# Patient Record
Sex: FEMALE | Race: WHITE | URBAN - METROPOLITAN AREA
[De-identification: names, ages, dates, MRNs, and addresses within clinical notes are randomized per-mention and may not be internally consistent; named-entity substitution may affect disease eponyms.]

---

## 2017-08-09 ENCOUNTER — THERAPY VISIT (OUTPATIENT)
Dept: PHYSICAL THERAPY | Facility: CLINIC | Age: 82
End: 2017-08-09
Payer: MEDICARE

## 2017-08-09 DIAGNOSIS — R27.9 LACK OF COORDINATION: ICD-10-CM

## 2017-08-09 DIAGNOSIS — R35.0 URINARY FREQUENCY: ICD-10-CM

## 2017-08-09 DIAGNOSIS — N81.84 PELVIC RELAXATION DUE TO PELVIC MUSCLE WASTING: Primary | ICD-10-CM

## 2017-08-09 PROCEDURE — G8990 OTHER PT/OT CURRENT STATUS: HCPCS | Mod: GP | Performed by: PHYSICAL THERAPIST

## 2017-08-09 PROCEDURE — G8991 OTHER PT/OT GOAL STATUS: HCPCS | Mod: GP | Performed by: PHYSICAL THERAPIST

## 2017-08-09 PROCEDURE — 97110 THERAPEUTIC EXERCISES: CPT | Mod: GP | Performed by: PHYSICAL THERAPIST

## 2017-08-09 PROCEDURE — 97161 PT EVAL LOW COMPLEX 20 MIN: CPT | Mod: GP | Performed by: PHYSICAL THERAPIST

## 2017-08-09 NOTE — MR AVS SNAPSHOT
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    -  1    Lack of coordination        Urinary frequency           Follow-ups after your visit        Your next 10 appointments already scheduled     Aug 16, 2017  2:30 PM CDT   [facility] For [facility] Only with Hilda Grajeda [facility] ([facility]  )    [address] [facility] [address]   [phone]            Aug 23, 2017  2:30 PM CDT   [facility] For Women Only with Hilda Grajeda [facility] ([facility]  )    [address]  [facility] [address]   [phone]            Aug 30, 2017  1:50 PM CDT   [facility] For Women Only with Hilda Grajeda, [facility] ([facility]  )    [address]  [place] [address]   [phone]              Who to contact     If you have questions or need follow up information about today's clinic visit or your schedule please contact [facility] directly at [phone].  Normal or non-critical lab and imaging results will be communicated to you by MyChart, letter or phone within 4 business days after the clinic has received the results. If you do not hear from us within 7 days, please contact the clinic through MyChart or phone. If you have a critical or abnormal lab result, we will notify you by phone as soon as possible.  Submit refill requests through [employer] or call your pharmacy and they will forward the refill request to us. Please allow 3 business days for your refill to be completed.          Additional Information About Your  Visit        Tap[employer] Information     [employer] gives you secure access to your electronic health record. If you see a primary care provider, you can also send messages to your care team and make appointments. If you have questions, please call your primary care clinic.  If you do not have a primary care provider, please call [phone] and they will assist you.        Care EveryWhere ID     This is your Care EveryWhere ID. This could be used by other organizations to access your [place] medical records  [id number]         Blood Pressure from Last 3 Encounters:   No data found for BP    Weight from Last 3 Encounters:   No data found for Wt              We Performed the Following     HC PT EVAL, LOW COMPLEXITY     PAYAM CERT REPORT     THERAPEUTIC EXERCISES        Primary Care Provider Office Phone # Fax #    [facility] [phone] [address] [place] [address]        Equal Access to Services     : Hadii aad ku hadasho Soomaali, waaxda luqadaha, qaybta kaalmada adeegyada, waxay arianin hayaan sabas henry . So [facility] [phone].    ATENCIÓN: Si habla español, tiene a bennett disposición servicios gratuitos de asistencia lingüística. Angieame al [phone].    We comply with applicable federal civil rights laws and [place] laws. We do not discriminate on the basis of race, color, national origin, age, disability sex, sexual orientation or gender identity.            Thank you!     Thank you for choosing [facility]  for your care. Our goal is always to provide you with excellent care. Hearing back from our patients is one way we can continue to improve our services. Please take a few minutes to complete the written survey that you may receive in the mail after your visit with us. Thank you!             Your Updated Medication List - Protect others around you: Learn how to safely use, store and throw away your medicines at  www.disposemymeds.org.      Notice  As of 8/9/2017  3:06 PM    You have not been prescribed any medications.

## 2017-08-09 NOTE — PROGRESS NOTES
[place] [facility] Initial Evaluation      Subjective:    Patient is a 85 year old female presenting with rehab pelvic hpi. The history is provided by the patient.   Tani Ferreira is a 85 year old female with a pelvic dysfunction condition.  Condition occurred with:  Insidious onset.  Condition occurred: for unknown reasons.  This is a chronic condition  Pt states that she saw a urologist in [place] who diagnosed her w/a prolapse and referred her to PT, which she did for 6-8 sessions already there.  She returned to [place] in May and has not been doing the PT ex's since she has returned though.  She would like to continue to work on her PF mm, so she did have her PCP refer her to PT again on 7/31/17.  She does note that she has had several UTIs, and just got over one in July. She does think that the probiotics seem to help with her irritable bowel, and seems to have helped some with the urinary sx's as well.  Now that UTI is gone, her urinary leaking and frequency have gone away.  She voids about every 2-3 hrs during the day and is only getting up 1x/night. She does wear a pad, but denies having any urinary leaking.  Her BMs are regular w/NL stool consistency.  She is only drinking about 6 glasses of water per day, and about 2-3 cups of coffee per day.  She used to also drink cranberry juice, but has not done that in months..    Site of Pain: none.    Quality: NA. Episode frequency: NA.      Symptoms are exacerbated by stress (for UTIs, pt thinks)     Special testing: US on kidneys.  Previous treatment includes physical therapy.  There was moderate improvement following previous treatment.  General health as reported by patient is good.  Pertinent medical history includes:  N/A.  Medical allergies: none.  Other surgeries include:  None.  Current medications:  None as reported by the patient.  Current occupation is retired.    Primary job tasks include:  Driving, prolonged sitting, prolonged standing and  repetitive tasks (housecleaning, laundry, cooking, cares for her dog).    Barriers include:  None as reported by the patient and lives alone.    Red flags:  None as reported by the patient.                        Objective:    System                                 Pelvic Dysfunction Evaluation:    Bladder/Pelvic Problems:  Not much of a problem now that UTI has cleared  Storage Problem:  Frequency  Emptying Problem:  Incomplete emptying      Diagnostic Tests:  Diagnostic tests pelvic: in [place], possibly had urodynamics?                        Flexibility:  normal        Pelvic Clock Exam:  NA              Reflex Testing:  normal    External Assessment:    Skin Condition:  Atrophic    Bearing Down/Coughing:  Cystourethrocele  Tissue Symmetry:  Normal  Introitus:  Normal  Muscle Contraction/Perineal Mobility:  Slight lift, no urogential triangle descent  Internal Assessment:  Internal assessment pelvic: deferred per pt wishes.              SEMG Biofeedback:    Equipment:  MR-10 unit    Suraface electrode placement--Perianal:  Bilaterally placed  Baseline EMG PM:  1.5 uV    Peak pelvic muscle contraction:  10  Sustained contraction:  Avg of 5 uV, difficult for pt to maintain steady contraction, tends to hold breath, and when she breathes NL'ly, loses the PF contraction  EMG interpretation to fatigue:  3-5 seconds  Position:  SupineAdditional History:    Number of Pregnancies: 3    Caffeine Consumption:  2-3 per day                     General     ROS    Assessment/Plan:      Patient is a 85 year old female with pelvic complaints.    Patient has the following significant findings with corresponding treatment plan.                Diagnosis 1:  PF dysfunction w/prolapse  Decreased strength - therapeutic exercise and therapeutic activities  Impaired muscle performance - biofeedback and neuro re-education  Decreased function - therapeutic activities    Therapy Evaluation Codes:   1) History comprised of:   Personal  factors that impact the plan of care:      Past/current experiences and Time since onset of symptoms.    Comorbidity factors that impact the plan of care are:      None.     Medications impacting care: None.  2) Examination of Body Systems comprised of:   Body structures and functions that impact the plan of care:      Pelvis.   Activity limitations that impact the plan of care are:      Frequency.  3) Clinical presentation characteristics are:   Stable/Uncomplicated.  4) Decision-Making    Low complexity using standardized patient assessment instrument and/or measureable assessment of functional outcome.  Cumulative Therapy Evaluation is: Low complexity.    Previous and current functional limitations:  (See Goal Flow Sheet for this information)    Short term and Long term goals: (See Goal Flow Sheet for this information)     Communication ability:  Patient appears to be able to clearly communicate and understand verbal and written communication and follow directions correctly.  Treatment Explanation - The following has been discussed with the patient:   RX ordered/plan of care  Anticipated outcomes  Possible risks and side effects  This patient would benefit from PT intervention to resume normal activities.   Rehab potential is good.    Frequency:  1 X week, once daily  Duration:  for 4 weeks tapering to 2 X a month over 1 month  Discharge Plan:  Achieve all LTG.  Independent in home treatment program.  Reach maximal therapeutic benefit.    Please refer to the daily flowsheet for treatment today, total treatment time and time spent performing 1:1 timed codes.

## 2017-08-09 NOTE — LETTER
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 The history is provided by the patient.   Tani Ferreira is a 85 year old female with a pelvic dysfunction condition.  Condition occurred with:  Insidious onset.  Condition occurred: for unknown reasons.  This is a chronic condition  Pt states that she saw a urologist in [place] who diagnosed her w/a prolapse and referred her to PT, which she did for 6-8 sessions already there.  She returned to [place] in May and has not been doing the PT ex's since she has returned though.  She would like to continue to work on her PF mm, so she did have her PCP refer her to PT again on 17.  She does note that she has had several UTIs, and just got over one in July. She does think that the probiotics seem to help with her irritable bowel, and seems to have helped some with the urinary sx's as well.  Now that UTI is gone, her urinary leaking and frequency have gone away.  She voids about every 2-3 hrs during the day and is only getting up 1x/night. She does wear a pad, but denies having any urinary leaking.  Her BMs are regular w/NL stool consistency.  She is only drinking about 6 glasses of water per day, and about 2-3 cups of coffee per day.  She used to also drink cranberry juice, but has not done that in months..     Site of Pain: none.    Quality: NA. Episode frequency: NA.      Symptoms are exacerbated by stress (for UTIs, pt thinks)     Special testing: US on kidneys.  Previous treatment includes physical therapy.  There was moderate improvement following previous treatment.  General health as reported by patient is good.  Pertinent medical history includes:  N/A.  Medical allergies: none.  Other surgeries include:  None.  Current medications:  None as reported by the patient.  Current occupation is retired.    Primary job tasks include:  Driving, prolonged sitting, prolonged standing and repetitive tasks (housecleaning, laundry, cooking, cares for her dog).  Barriers include:  None as reported by the patient and lives alone.  Red flags:  None as reported by the patient.    Objective:    Pelvic Dysfunction Evaluation:    Bladder/Pelvic Problems:  Not much of a problem now that UTI has cleared  Storage Problem:  Frequency  Emptying Problem:  Incomplete emptying      Diagnostic Tests:  Diagnostic tests pelvic: in [place], possibly had urodynamics?  Flexibility:  normal  Pelvic Clock Exam:  NA  Reflex Testing:  normal  External Assessment:    Skin Condition:  Atrophic  Bearing Down/Coughing:  Cystourethrocele  Tissue Symmetry:  Normal  Introitus:  Normal  Muscle Contraction/Perineal Mobility:  Slight lift, no urogential triangle descent  Internal Assessment:  Internal assessment pelvic: deferred per pt wishes.  SEMG Biofeedback:    Equipment:  MR-10 unit  Suraface electrode placement--Perianal:  Bilaterally placed  Baseline EMG PM:  1.5 uV          PATIENTS NAME:  Tani Ferreira             : 1932  Peak pelvic muscle contraction:  10  Sustained contraction:  Avg of 5 uV, difficult for pt to maintain steady contraction, tends to hold breath, and when she breathes NL'ly, loses the PF contraction  EMG interpretation to fatigue:  3-5 seconds  Position:  SupineAdditional History:  Number of Pregnancies: 3  Caffeine  Consumption:  2-3 per day          Assessment/Plan:    Patient is a 85 year old female with pelvic complaints.    Patient has the following significant findings with corresponding treatment plan.                Diagnosis 1:  PF dysfunction w/prolapse  Decreased strength - therapeutic exercise and therapeutic activities  Impaired muscle performance - biofeedback and neuro re-education  Decreased function - therapeutic activities    Therapy Evaluation Codes:   1) History comprised of:   Personal factors that impact the plan of care:      Past/current experiences and Time since onset of symptoms.    Comorbidity factors that impact the plan of care are:      None.     Medications impacting care: None.  2) Examination of Body Systems comprised of:   Body structures and functions that impact the plan of care:      Pelvis.   Activity limitations that impact the plan of care are:      Frequency.  3) Clinical presentation characteristics are:   Stable/Uncomplicated.  4) Decision-Making    Low complexity using standardized patient assessment instrument and/or measureable assessment of functional outcome.  Cumulative Therapy Evaluation is: Low complexity.  Previous and current functional limitations:  (See Goal Flow Sheet for this information)    Short term and Long term goals: (See Goal Flow Sheet for this information)   Communication ability:  Patient appears to be able to clearly communicate and understand verbal and written communication and follow directions correctly.  Treatment Explanation - The following has been discussed with the patient:   RX ordered/plan of care  Anticipated outcomes  Possible risks and side effects  This patient would benefit from PT intervention to resume normal activities.   Rehab potential is good.  Frequency:  1 X week, once daily  Duration:  for 4 weeks tapering to 2 X a month over 1 month  Discharge Plan:  Achieve all LTG.  Independent in home treatment program.  Reach maximal therapeutic  "benefit.  Please refer to the daily flowsheet for treatment today, total treatment time and time spent performing 1:1 timed codes.     Caregiver Signature/Credentials _____________________________ Date ________       AGUILAR Byrd   I have reviewed and certified the need for these services and plan of treatment while under my care.      PHYSICIAN'S SIGNATURE:   _____________________________________  Date___________                     Jillian Angel  Certification period:  Beginning of Cert date period: 08/09/17 to  End of Cert period date: 11/06/17     Functional Level Progress Report: Please see attached \"Goal Flow sheet for Functional level.\"  ____X____ Continue Services or       ________ DC Services              Service dates: From  SOC Date: 08/09/17 date to present                       "

## 2017-08-23 ENCOUNTER — THERAPY VISIT (OUTPATIENT)
Dept: PHYSICAL THERAPY | Facility: CLINIC | Age: 82
End: 2017-08-23
Payer: MEDICARE

## 2017-08-23 DIAGNOSIS — R27.9 LACK OF COORDINATION: ICD-10-CM

## 2017-08-23 DIAGNOSIS — R35.0 URINARY FREQUENCY: ICD-10-CM

## 2017-08-23 DIAGNOSIS — N81.84 PELVIC RELAXATION DUE TO PELVIC MUSCLE WASTING: ICD-10-CM

## 2017-08-23 PROCEDURE — 97110 THERAPEUTIC EXERCISES: CPT | Mod: GP | Performed by: PHYSICAL THERAPIST

## 2017-08-23 PROCEDURE — 97530 THERAPEUTIC ACTIVITIES: CPT | Mod: GP | Performed by: PHYSICAL THERAPIST

## 2017-08-30 ENCOUNTER — THERAPY VISIT (OUTPATIENT)
Dept: PHYSICAL THERAPY | Facility: CLINIC | Age: 82
End: 2017-08-30
Payer: MEDICARE

## 2017-08-30 DIAGNOSIS — R35.0 URINARY FREQUENCY: ICD-10-CM

## 2017-08-30 DIAGNOSIS — R27.9 LACK OF COORDINATION: ICD-10-CM

## 2017-08-30 DIAGNOSIS — N81.84 PELVIC RELAXATION DUE TO PELVIC MUSCLE WASTING: ICD-10-CM

## 2017-08-30 PROCEDURE — 97112 NEUROMUSCULAR REEDUCATION: CPT | Mod: GP | Performed by: PHYSICAL THERAPIST

## 2017-08-30 PROCEDURE — 97110 THERAPEUTIC EXERCISES: CPT | Mod: GP | Performed by: PHYSICAL THERAPIST

## 2017-09-07 ENCOUNTER — THERAPY VISIT (OUTPATIENT)
Dept: PHYSICAL THERAPY | Facility: CLINIC | Age: 82
End: 2017-09-07
Payer: MEDICARE

## 2017-09-07 DIAGNOSIS — N81.84 PELVIC RELAXATION DUE TO PELVIC MUSCLE WASTING: ICD-10-CM

## 2017-09-07 DIAGNOSIS — R35.0 URINARY FREQUENCY: ICD-10-CM

## 2017-09-07 DIAGNOSIS — R27.9 LACK OF COORDINATION: ICD-10-CM

## 2017-09-07 PROCEDURE — 97112 NEUROMUSCULAR REEDUCATION: CPT | Mod: GP | Performed by: PHYSICAL THERAPIST

## 2017-09-07 PROCEDURE — 97110 THERAPEUTIC EXERCISES: CPT | Mod: GP | Performed by: PHYSICAL THERAPIST

## 2017-09-25 ENCOUNTER — THERAPY VISIT (OUTPATIENT)
Dept: PHYSICAL THERAPY | Facility: CLINIC | Age: 82
End: 2017-09-25
Payer: MEDICARE

## 2017-09-25 DIAGNOSIS — R35.0 URINARY FREQUENCY: ICD-10-CM

## 2017-09-25 DIAGNOSIS — R27.9 LACK OF COORDINATION: ICD-10-CM

## 2017-09-25 DIAGNOSIS — N81.84 PELVIC RELAXATION DUE TO PELVIC MUSCLE WASTING: ICD-10-CM

## 2017-09-25 PROCEDURE — 97530 THERAPEUTIC ACTIVITIES: CPT | Mod: GP | Performed by: PHYSICAL THERAPIST

## 2017-09-25 PROCEDURE — G8990 OTHER PT/OT CURRENT STATUS: HCPCS | Mod: GP | Performed by: PHYSICAL THERAPIST

## 2017-09-25 PROCEDURE — 97110 THERAPEUTIC EXERCISES: CPT | Mod: GP | Performed by: PHYSICAL THERAPIST

## 2017-09-25 PROCEDURE — 97112 NEUROMUSCULAR REEDUCATION: CPT | Mod: GP | Performed by: PHYSICAL THERAPIST

## 2017-09-25 PROCEDURE — G8991 OTHER PT/OT GOAL STATUS: HCPCS | Mod: GP | Performed by: PHYSICAL THERAPIST

## 2017-09-25 NOTE — MR AVS SNAPSHOT
After Visit Summary   9/25/2017    Tani Ferreira    MRN: [id number]           Patient Information     Date Of Birth          5/14/1932        Visit Information        Provider Department      9/25/2017 12:20 PM Hilda Grajeda PT [place] 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 ([facility]  )    [address]  [facility] [id number] [phone]              Who to contact     If you have questions or need follow up information about today's clinic visit or your schedule please contact [place] [facility] CLYDE TANYA directly at [phone].  Normal or non-critical lab and imaging results will be communicated to you by [employer], letter or phone within 4 business days after the clinic has received the results. If you do not hear from us within 7 days, please contact the clinic through Subtex[employer] or phone. If you have a critical or abnormal lab result, we will notify you by phone as soon as possible.  Submit refill requests through [employer] or call your pharmacy and they will forward the refill request to us. Please allow 3 business days for your refill to be completed.          Additional Information About Your Visit        [employer] Information     [employer] gives you secure access to your electronic health record. If you see a primary care provider, you can also send messages to your care team and make appointments. If you have questions, please call your primary care clinic.  If you do not have a primary care provider, please call [phone] and they will assist you.        Care EveryWhere ID     This is your Care EveryWhere ID. This  could be used by other organizations to access your [place] medical records  [id number]         Blood Pressure from Last 3 Encounters:   No data found for BP    Weight from Last 3 Encounters:   No data found for Wt              We Performed the Following     [facility] PROGRESS NOTES REPORT     NEUROMUSCULAR RE-EDUCATION     THERAPEUTIC ACTIVITIES     THERAPEUTIC EXERCISES        Primary Care Provider Office Phone # Fax #    [facility] [phone] [address]  [place] [address]        Equal Access to Services     [facility] : Hadii aad ku hadasho Soomaali, waaxda luqadaha, qaybta kaalmada adeegyada, waxay idiin hayaan adeeg kharash la'aan . So [facility] [phone].    ATENCIÓN: Si habla español, tiene a bennett disposición servicios gratuitos de asistencia lingüística. [facility] [phone].    We comply with applicable federal civil rights laws and [place] laws. We do not discriminate on the basis of race, color, national origin, age, disability sex, sexual orientation or gender identity.            Thank you!     Thank you for choosing [facility]  for your care. Our goal is always to provide you with excellent care. Hearing back from our patients is one way we can continue to improve our services. Please take a few minutes to complete the written survey that you may receive in the mail after your visit with us. Thank you!             Your Updated Medication List - Protect others around you: Learn how to safely use, store and throw away your medicines at www.disposemymeds.org.      Notice  As of 9/25/2017  1:13 PM    You have not been prescribed any medications.

## 2017-09-25 NOTE — PROGRESS NOTES
"Subjective:    HPI                    Objective:    System    Physical Exam    General     ROS    Assessment/Plan:      PROGRESS  REPORT    Progress reporting period is from 8/9/17 to 9/25/17.       SUBJECTIVE  Subjective changes noted by patient:   Pt states that she can sit in her recliner for hrs and not feel the urge to urinate, but as soon as she gets up she feels it. She can still get to the bathroom without leaking, though. Pt notes that her \"stomach is still bothering\" her, sometimes it feels like she needs to have a BM but then nothing comes out. She is having about 3-4 BMs per day, always solid though.    Current pain level is  0/10.     Previous pain level was  0/10  .   Changes in function:  Yes (See Goal flowsheet attached for changes in current functional level)  Adverse reaction to treatment or activity: None    OBJECTIVE  Changes noted in objective findings:    Objective: PF strength about the same as last time, but slight improvement w/sustained contractions w/better breathing and steadier holds.  Avg of 15 uV in supine for tonic contractions, in sitting she avg'd 8 uV for long holds and 8 for quick contractions (but after having already done about 20 total contractions).     ASSESSMENT/PLAN  Updated problem list and treatment plan: Diagnosis 1:  PF dysfunction w/prolapse and urinary frequency  Decreased strength - therapeutic exercise and therapeutic activities  Impaired muscle performance - biofeedback and neuro re-education  Decreased function - therapeutic activities  STG/LTGs have been met or progress has been made towards goals:  Yes (See Goal flow sheet completed today.)  Assessment of Progress: The patient's condition is improving.  The patient's condition has potential to improve.  Patient is meeting short term goals and is progressing towards long term goals.  Self Management Plans:  Patient has been instructed in a home treatment program.  Patient is independent in a home treatment " program.  Patient  has been instructed in self management of symptoms.  Patient is independent in self management of symptoms.  I have re-evaluated this patient and find that the nature, scope, duration and intensity of the therapy is appropriate for the medical condition of the patient.  Tain continues to require the following intervention to meet STG and LTG's:  PT    Recommendations:  This patient would benefit from continued therapy.     Frequency:  1 X a month, once daily  Duration:  for 1 months          Please refer to the daily flowsheet for treatment today, total treatment time and time spent performing 1:1 timed codes.

## 2017-11-09 ENCOUNTER — THERAPY VISIT (OUTPATIENT)
Dept: PHYSICAL THERAPY | Facility: CLINIC | Age: 82
End: 2017-11-09
Payer: MEDICARE

## 2017-11-09 DIAGNOSIS — R27.9 LACK OF COORDINATION: ICD-10-CM

## 2017-11-09 DIAGNOSIS — N81.84 PELVIC RELAXATION DUE TO PELVIC MUSCLE WASTING: ICD-10-CM

## 2017-11-09 DIAGNOSIS — R35.0 URINARY FREQUENCY: ICD-10-CM

## 2017-11-09 PROCEDURE — G8991 OTHER PT/OT GOAL STATUS: HCPCS | Mod: GP | Performed by: PHYSICAL THERAPIST

## 2017-11-09 PROCEDURE — 97112 NEUROMUSCULAR REEDUCATION: CPT | Mod: GP | Performed by: PHYSICAL THERAPIST

## 2017-11-09 PROCEDURE — 97110 THERAPEUTIC EXERCISES: CPT | Mod: GP | Performed by: PHYSICAL THERAPIST

## 2017-11-09 PROCEDURE — G8992 OTHER PT/OT  D/C STATUS: HCPCS | Mod: GP | Performed by: PHYSICAL THERAPIST

## 2017-11-09 PROCEDURE — 97530 THERAPEUTIC ACTIVITIES: CPT | Mod: GP | Performed by: PHYSICAL THERAPIST

## 2017-11-09 NOTE — MR AVS SNAPSHOT
After Visit Summary   11/9/2017    Tani Ferreira    MRN: [id number]           Patient Information     Date Of Birth          5/14/1932        Visit Information        Provider Department      11/9/2017 12:20 PM Hilda Grajeda PT [facility]        Today's Diagnoses     Pelvic relaxation due to pelvic muscle wasting        Lack of coordination        Urinary frequency           Follow-ups after your visit        Who to contact     If you have questions or need follow up information about today's clinic visit or your schedule please contact [facility] directly at [phone].  Normal or non-critical lab and imaging results will be communicated to you by Aldagen[employer], letter or phone within 4 business days after the clinic has received the results. If you do not hear from us within 7 days, please contact the clinic through Aldagenhart or phone. If you have a critical or abnormal lab result, we will notify you by phone as soon as possible.  Submit refill requests through [employer] or call your pharmacy and they will forward the refill request to us. Please allow 3 business days for your refill to be completed.          Additional Information About Your Visit        MyChart Information     [employer] gives you secure access to your electronic health record. If you see a primary care provider, you can also send messages to your care team and make appointments. If you have questions, please call your primary care clinic.  If you do not have a primary care provider, please call [phone] and they will assist you.        Care EveryWhere ID     This is your Care EveryWhere ID. This could be used by other organizations to access your [facility] medical records  [id number]         Blood Pressure from Last 3 Encounters:   No data found for BP    Weight from Last 3 Encounters:   No data found for Wt              We Performed the Following     PAYAM  PROGRESS NOTES REPORT     NEUROMUSCULAR RE-EDUCATION     THERAPEUTIC ACTIVITIES     THERAPEUTIC EXERCISES        Primary Care Provider Office Phone # Fax #    [facility] [phone] [address] [address]        Equal Access to Services     BRADLEY JACOB : Hadii aad ku hadasho Soomaali, waaxda luqadaha, qaybta kaalmada adeegyada, waxay idiin hayarmidan ademariela coyne lamichelle . So [facility] [phone].    ATENCIÓN: Si habla español, tiene a bennett disposición servicios gratuitos de asistencia lingüística. Llame al [phone].    We comply with applicable federal civil rights laws and [place] laws. We do not discriminate on the basis of race, color, national origin, age, disability, sex, sexual orientation, or gender identity.            Thank you!     Thank you for choosing [facility]  for your care. Our goal is always to provide you with excellent care. Hearing back from our patients is one way we can continue to improve our services. Please take a few minutes to complete the written survey that you may receive in the mail after your visit with us. Thank you!             Your Updated Medication List - Protect others around you: Learn how to safely use, store and throw away your medicines at www.disposemymeds.org.      Notice  As of 11/9/2017  1:58 PM    You have not been prescribed any medications.

## 2017-11-09 NOTE — PROGRESS NOTES
Subjective:    HPI                    Objective:    System    Physical Exam    General     ROS    Assessment/Plan:      DISCHARGE REPORT    Progress reporting period is from 9/25/17 to 11/9/17.       SUBJECTIVE  Subjective changes noted by patient:   Pt notes that [place] but has been rechecked 1 wk ago and it is gone.  She admits that she doesn't remember to do her ex's every day, but doesn't feel that the prolapse is too bad.    Current pain level is  0/10.     Previous pain level was  0/10  .   Changes in function:  Yes (See Goal flowsheet attached for changes in current functional level)  Adverse reaction to treatment or activity: None    OBJECTIVE  Changes noted in objective findings:    PF strength is improved for phasic, going from an avg of 3 uV in sitting to 10 uV in sitting, but the tonic contractions are still poorly held/maintained, and pt tends to hold her breath yet, with an avg of only 5 uV.  On observation, prolapse is still visible w/cough, but she is able to maintain position of it w/PFC w/cough.     ASSESSMENT/PLAN  Updated problem list and treatment plan: Diagnosis 1:  PF dysfunction w/prolapse    STG/LTGs have been met or progress has been made towards goals:  Yes (See Goal flow sheet completed today.)  Assessment of Progress: The patient has met all of their long term goals.  Self Management Plans:  Patient has been instructed in a home treatment program.  Patient is independent in a home treatment program.  Patient  has been instructed in self management of symptoms.  Patient is independent in self management of symptoms.    Tani continues to require the following intervention to meet STG and LTG's:  PT intervention is no longer required to meet STG/LTG.    Recommendations:  This patient is ready to be discharged from therapy and continue their home treatment program.    Please refer to the daily flowsheet for treatment today, total treatment time and  time spent performing 1:1 timed codes.

## 2018-03-02 ENCOUNTER — OFFICE VISIT (OUTPATIENT)
Dept: INTERNAL MEDICINE | Facility: CLINIC | Age: 83
End: 2018-03-02
Payer: MEDICARE

## 2018-03-02 ENCOUNTER — OFFICE VISIT (OUTPATIENT)
Dept: UROLOGY | Facility: CLINIC | Age: 83
End: 2018-03-02
Payer: MEDICARE

## 2018-03-02 VITALS
DIASTOLIC BLOOD PRESSURE: 96 MMHG | HEIGHT: 62 IN | SYSTOLIC BLOOD PRESSURE: 164 MMHG | OXYGEN SATURATION: 96 % | TEMPERATURE: 98 F | HEART RATE: 98 BPM | BODY MASS INDEX: 27.34 KG/M2 | WEIGHT: 148.56 LBS

## 2018-03-02 DIAGNOSIS — R39.89 SUSPECTED UTI: Primary | ICD-10-CM

## 2018-03-02 DIAGNOSIS — N39.0 RECURRENT UTI: Primary | ICD-10-CM

## 2018-03-02 LAB
BACTERIA #/AREA URNS AUTO: ABNORMAL /HPF
BILIRUB UR QL STRIP: NEGATIVE
CLARITY UR REFRACT.AUTO: CLEAR
COLOR UR AUTO: YELLOW
GLUCOSE UR QL STRIP: NEGATIVE
HGB UR QL STRIP: NEGATIVE
KETONES UR QL STRIP: NEGATIVE
LEUKOCYTE ESTERASE UR QL STRIP: ABNORMAL
MICROSCOPIC COMMENT: ABNORMAL
NITRITE UR QL STRIP: NEGATIVE
NON-SQ EPI CELLS #/AREA URNS AUTO: 1 /HPF
PH UR STRIP: 5 [PH] (ref 5–8)
PROT UR QL STRIP: NEGATIVE
SP GR UR STRIP: 1 (ref 1–1.03)
SQUAMOUS #/AREA URNS AUTO: 2 /HPF
URN SPEC COLLECT METH UR: ABNORMAL
UROBILINOGEN UR STRIP-ACNC: NEGATIVE EU/DL
WBC #/AREA URNS AUTO: 5 /HPF (ref 0–5)

## 2018-03-02 PROCEDURE — 99999 PR PBB SHADOW E&M-EST. PATIENT-LVL II: CPT | Mod: PBBFAC,,, | Performed by: STUDENT IN AN ORGANIZED HEALTH CARE EDUCATION/TRAINING PROGRAM

## 2018-03-02 PROCEDURE — 87077 CULTURE AEROBIC IDENTIFY: CPT

## 2018-03-02 PROCEDURE — 87088 URINE BACTERIA CULTURE: CPT

## 2018-03-02 PROCEDURE — 99212 OFFICE O/P EST SF 10 MIN: CPT | Mod: PBBFAC,27,PO | Performed by: STUDENT IN AN ORGANIZED HEALTH CARE EDUCATION/TRAINING PROGRAM

## 2018-03-02 PROCEDURE — 87086 URINE CULTURE/COLONY COUNT: CPT

## 2018-03-02 PROCEDURE — 99204 OFFICE O/P NEW MOD 45 MIN: CPT | Mod: S$PBB,,, | Performed by: STUDENT IN AN ORGANIZED HEALTH CARE EDUCATION/TRAINING PROGRAM

## 2018-03-02 PROCEDURE — 87186 SC STD MICRODIL/AGAR DIL: CPT

## 2018-03-02 PROCEDURE — 99203 OFFICE O/P NEW LOW 30 MIN: CPT | Mod: S$PBB,,, | Performed by: INTERNAL MEDICINE

## 2018-03-02 PROCEDURE — 99204 OFFICE O/P NEW MOD 45 MIN: CPT | Mod: PBBFAC,PO | Performed by: INTERNAL MEDICINE

## 2018-03-02 PROCEDURE — 81001 URINALYSIS AUTO W/SCOPE: CPT

## 2018-03-02 PROCEDURE — 99999 PR PBB SHADOW E&M-NEW PATIENT-LVL IV: CPT | Mod: PBBFAC,,, | Performed by: INTERNAL MEDICINE

## 2018-03-02 RX ORDER — PHENAZOPYRIDINE HYDROCHLORIDE 200 MG/1
200 TABLET, FILM COATED ORAL 3 TIMES DAILY PRN
Qty: 9 TABLET | Refills: 0 | Status: SHIPPED | OUTPATIENT
Start: 2018-03-02 | End: 2018-03-05

## 2018-03-02 RX ORDER — CIPROFLOXACIN 250 MG/1
250 TABLET, FILM COATED ORAL 2 TIMES DAILY
Qty: 10 TABLET | Refills: 0 | Status: SHIPPED | OUTPATIENT
Start: 2018-03-02 | End: 2018-03-07

## 2018-03-02 NOTE — PROGRESS NOTES
"Subjective:       Patient ID: Tristin Weeks is a 85 y.o. female.    Chief Complaint: Urinary Frequency    HPI Mrs. Weeks is an 85-year-old female who presents with a chief complaint of urinary frequency.  Mrs. Weeks has been living in both the [place] area as well as in [place].  She reports a history of frequent urinary tract infections.  She has had urinalyses and urine cultures performed almost monthly since June 2017.  On review of her prior cultures, it appears that one has grown Klebsiella and one has grown klebsiella-Raoultella.  Patient's daughter pulled up her medical my chart in [place] and was looking for the sensitivities to her prior urine cultures.  Unfortunately she was unable to see the sensitivities. Another culture showed multiple organisms indicating contamination.  The rest were negative/normal.     She has most recently been treated for Urinary tract infection by her PCP in [place] who treated her with keflex 500 mg PO BID X 7 days. She started this on 2/20 and ended this regimen on 2/27.      She felt a little better while taking the Keflex.  However her symptoms never resolved.  Her symptoms include "not feeling well", urinary frequency, and pressure in the suprapubic region.  She denies any dysuria or hematuria. Her daughter has noticed that her mother's urine is cloudy. Of note, she has seen a urologist in the past (thinks he was at [facility]) who diagnosed her with a cystocele.  She was told that she may need surgery to correct this and she did not want surgery.  She has not continued to follow with a urologist.    Review of Systems   Gastrointestinal: Positive for abdominal pain (suprapubic discomfort).   Genitourinary: Positive for frequency. Negative for dysuria, hematuria and vaginal pain.       Objective:      Physical Exam   Constitutional: She is oriented to person, place, and time. She appears well-developed and well-nourished. No distress.   Cardiovascular: Normal " rate, regular rhythm, normal heart sounds and intact distal pulses.  Exam reveals no gallop and no friction rub.    No murmur heard.  Abdominal: Soft. Bowel sounds are normal. She exhibits no distension and no mass. There is no tenderness (no tenderness currently, during exam). There is no rebound and no guarding.   Neurological: She is alert and oriented to person, place, and time.   Skin: Skin is warm and dry. She is not diaphoretic.   Psychiatric: She has a normal mood and affect. Her behavior is normal. Judgment and thought content normal.   Vitals reviewed.      Assessment:       1. Suspected UTI        Plan:         1. Suspected UTI  -UA and urine culture obtained. Rapid in office Urine dipstick:     Yellow color, sp gravity 1.000, pH 5, +1 leukocytes, neg nitrates, trace protein, norm glucose, neg ketones, norm urobiliogen, neg bilirubin, blood 50  -Treating empirically with ciprofloxacin. Pyridium for discomfort/pain  -Referral placed to urology; has appt with Dr. Farrar at 2 pm    RTC for worsening symptoms  Will return next week for establish care visit

## 2018-03-02 NOTE — Clinical Note
"Cole Enrique, thanks for the referral. I'm going to have them finish up the cipro you Rx'd them then come back and I'll catheterize them on Friday and review their renal ultrasound.  In the future if she ever has these symptoms and would like to rule out a UTI, have them come and see me or my nurse to obtain a catheterized urine sample to get the best possible specimen for culture. I saw from her historical cultures theres a lot of contaminants in her samples... So I want to make sure it's truly related to a UTI and not anything else. Some of her symptoms "feeling out of it, weakness in the legs, etc" don't quite fit a UTI "

## 2018-03-02 NOTE — PATIENT INSTRUCTIONS
Discussed healthy voiding habits to help try to prevent recurrence of UTI's including wiping habits, avoiding douching products, avoiding tub soaks, increase hydration.   Goal of 8 glasses of water per day.

## 2018-03-02 NOTE — PROGRESS NOTES
"Subjective:       Patient ID: Tristin Weeks is a 85 y.o. female.    Chief Complaint: recurrent UTIs  This is a 85 y.o.  female patient that is new to me.  The patient is referred to me by Dr. Anastasiya Escobedo for recurrent UTIs.  She is here today with her daughter Ms. Black (we had her sign permission today so if we are unable to reach the patient or if she doesn't understand, we have her permission to contact her daughter). She reports a history of urinary tract infections, approximately 2 per year. She states that her UTI symptoms- she "feels awful", frequency, denies dysuria, denies gross hematuria, her daughter describes that she is more combative and agitated when she may have a UTI. The patient reports that when she "begins to feel bad" she lets it "go on for awhile until it gets really bad and I want to die." She has a difficult time describing what symptoms escalate to the point of that severe level of bother. She believes when it is that severe she has a headache, difficulty walking, and feels like her legs are weak. Her daughter fills in the gaps and informed me that sometimes her mom becomes very agitated, combative, and has short term memory loss without recollection of their interactions or conversations.   She has been evaluated by Dr. Andino at [facility]. She was prescribed estrogen cream but stopped it because she "didn't like it." She feels like she recalls that it caused some local itchiness. She was also referred to pelvic floor therapy and feels like this therapy did help. She was told she has a cystocele but was not recommended any intervention for this and was told it was mild per the patient.     She drinks a cup of coffee every morning, then some water possibly 2 cups of water during the day.     Pt reports she cannot give us a urine specimen as she just urinated at Dr. Escobedo's.   US about 1 year ago. Her daughter feels like she recalls no other finding other than the kidneys were " ""smaller"    She moved to [place] from [place]. She lives in [place] for 6 months and in [place] for the other 6 months.   She is here today with her daughter - Sofia.     Urine culture history from outside reports (SCANNED INTO MEDIA TODAY)  3/7/17 - mixed microbial population, no predominating organisms, probable contaminants  The next 4 urine culture results were obtained from a mychart printout by the patient's daughter - no sensitivity results available  9/27/17 - <10K CFU multiple organisms, probably contaminants  9/27/17 - 50-100K CFU klebsiella oxytoca  11/2/17 - no growth  12/18/17 - >100K Raoultella ornithinolytica  (per outside ID consultant, this is a gram negative organism that used to be classified as klebsiella)  [facility] result:  2/20/18 - Raoultella planticola >100K - sensitive to: amox/clav, cefepime, ceftriaxone, cefuroxime, cephalothin, cipro, gent, imipenem, nitrofurantoin, tetracycline, tobramycin, bactrim; resistant to: ampicillin, piperacillin    No results found for: CREATININE    I personally reviewed the images: no imaging to review  ---  No past medical history on file.    Past Surgical History:   Procedure Laterality Date    APPENDECTOMY         No family history on file.    Social History   Substance Use Topics    Smoking status: Former Smoker    Smokeless tobacco: Never Used    Alcohol use No       Current Outpatient Prescriptions on File Prior to Visit   Medication Sig Dispense Refill    ciprofloxacin HCl (CIPRO) 250 MG tablet Take 1 tablet (250 mg total) by mouth 2 (two) times daily. 10 tablet 0    phenazopyridine (PYRIDIUM) 200 MG tablet Take 1 tablet (200 mg total) by mouth 3 (three) times daily as needed for Pain. 9 tablet 0     No current facility-administered medications on file prior to visit.        Review of patient's allergies indicates:   Allergen Reactions    Lisinopril Other (See Comments)     cough       Review of Systems   Constitutional: Negative for " activity change.   HENT: Negative for congestion.    Eyes: Negative for visual disturbance.   Respiratory: Negative for shortness of breath.    Cardiovascular: Negative for chest pain.   Gastrointestinal: Negative for abdominal distention.   Musculoskeletal: Negative for gait problem.   Skin: Negative for color change.   Neurological: Negative for dizziness.   Psychiatric/Behavioral: Negative for agitation.       Objective:      Physical Exam   Constitutional: She is oriented to person, place, and time. She appears well-developed.   HENT:   Head: Normocephalic and atraumatic.   Eyes: EOM are normal.   Neck: Normal range of motion.   Cardiovascular: Intact distal pulses.    Pulmonary/Chest: Effort normal.   Abdominal: Soft. She exhibits no distension. There is no tenderness.   Musculoskeletal: Normal range of motion.   Neurological: She is alert and oriented to person, place, and time.   Skin: Skin is warm and dry.   Psychiatric: She has a normal mood and affect.       Assessment:       1. Recurrent UTI        Plan:       1. UTI - a clean catch urine culture is pending from Dr. Escobedo's office. Will avoid sending duplicate urine samples. She was prescribed cipro which I agree is a good choice based off of the recent urine culture from an outside lab.   2. I will have the patient obtain a renal ultrasound, also specified that a postvoid residual be calculated and counseled the patient to void as close as possible to the ultrasound appointment.  3. I will have the patient RTC to see me in 1 week to followup her symptoms and also to catheterize her to minimize contaminants and send that catheterized urine for a culture. In the future, if concerns about a UTI, I would recommend having her come into the clinic to see me or for a nurses visit if I am ever out of the office to obtain a catheterize urine specimen for a culture. I would like to avoid unnecessary antibiotics if another process other than a UTI is occurring.  I would also like to avoid antibiotic resistance, and contaminants of the urine culture result. Will also perform pelvic exam at next visit during catheterization.   4. UTI prevention -discussed healthy voiding habits to help try to prevent recurrence of UTI's including wiping habits, voiding pre and post coitus, avoiding douches, avoiding tub soaks, increase hydration. Can continue usage of probiotics.  5. Will cc Dr. Escobedo on this note.    Recurrent UTI  -     US Retroperitoneal Complete (Kidney and; Future; Expected date: 03/02/2018

## 2018-03-02 NOTE — LETTER
March 2, 2018      Iva Escobedo MD  [address]  [place] [address]           [facility]  [address]  [place] [address]  Phone: [phone]          Patient: Tristin Weeks   MR Number: [id number]   YOB: 1932   Date of Visit: 3/2/2018       Dear Dr. Iva Escobedo:    Thank you for referring Tristin Weeks to me for evaluation. Attached you will find relevant portions of my assessment and plan of care.    If you have questions, please do not hesitate to call me. I look forward to following Tristin Weeks along with you.    Sincerely,    Tamanna Farrar MD    Enclosure  CC:  No Recipients    If you would like to receive this communication electronically, please contact [email or web address] or [phone] to request more information on [employer] Link access.    For providers and/or their staff who would like to refer a patient to [facility], please contact us through our one-stop-shop provider referral line, [facility], at [phone].    If you feel you have received this communication in error or would no longer like to receive these types of communications, please e-mail [email or web address]

## 2018-03-05 LAB — BACTERIA UR CULT: NORMAL

## 2018-03-06 ENCOUNTER — OFFICE VISIT (OUTPATIENT)
Dept: INTERNAL MEDICINE | Facility: CLINIC | Age: 83
End: 2018-03-06
Payer: MEDICARE

## 2018-03-06 VITALS
HEART RATE: 94 BPM | DIASTOLIC BLOOD PRESSURE: 78 MMHG | BODY MASS INDEX: 27.47 KG/M2 | SYSTOLIC BLOOD PRESSURE: 136 MMHG | WEIGHT: 149.25 LBS | OXYGEN SATURATION: 95 % | HEIGHT: 62 IN

## 2018-03-06 DIAGNOSIS — M85.80 OSTEOPENIA, UNSPECIFIED LOCATION: ICD-10-CM

## 2018-03-06 DIAGNOSIS — Z86.79 HISTORY OF HYPERTENSION: ICD-10-CM

## 2018-03-06 DIAGNOSIS — Z76.89 ENCOUNTER TO ESTABLISH CARE: Primary | ICD-10-CM

## 2018-03-06 DIAGNOSIS — Z86.19 H/O CLOSTRIDIUM DIFFICILE INFECTION: ICD-10-CM

## 2018-03-06 DIAGNOSIS — K21.9 GASTROESOPHAGEAL REFLUX DISEASE, ESOPHAGITIS PRESENCE NOT SPECIFIED: ICD-10-CM

## 2018-03-06 PROCEDURE — 99213 OFFICE O/P EST LOW 20 MIN: CPT | Mod: PBBFAC,PO | Performed by: INTERNAL MEDICINE

## 2018-03-06 PROCEDURE — 99999 PR PBB SHADOW E&M-EST. PATIENT-LVL III: CPT | Mod: PBBFAC,,, | Performed by: INTERNAL MEDICINE

## 2018-03-06 PROCEDURE — 99214 OFFICE O/P EST MOD 30 MIN: CPT | Mod: S$PBB,,, | Performed by: INTERNAL MEDICINE

## 2018-03-06 NOTE — PROGRESS NOTES
Subjective:       Patient ID: Tristin Weeks is a 85 y.o. female.    Chief Complaint: Establish Care (new pt ) and Follow-up (follow up from last friday 03/02/18 Poss UTI)    HPI Mrs. Weeks is an 85-year-old female with frequent urinary tract infections, history of osteopenia, GERD, hiatal hernia, history of hypertension and history of C. difficile who presents to establish care.  She was seen by me in an urgent care appointment 4 days ago. She would like to establish care with me. Although she still plans to see her PCP in [place] twice yearly (june and December) and have her annual exam done with her in December.    Frequent UTIs: Patient reports that while in [place], her primary care physician had standing orders for urinalysis and urine culture because she so frequently had symptoms of urinary tract infection.  When I saw her on March 2, a urinalysis and urine culture were obtained.  They have resulted greater than 100,000 colony-forming units of Escherichia coli which is pansensitive.  She has been treated with ciprofloxacin.  She saw Dr. Tamanna Farrar- a urologist-on Friday after her visit with me. Dr. Farrar plans for renal ultrasound. Patient is aware that if she has further urinary tract infection type symptoms, she is to go to Dr. Farrar's office for a catheterized urine specimen.    History of osteopenia: Patient reports that she has a history of osteopenia that was treated with Fosamax.  However she states that she had a DEXA scan performed in December 2017, and this was normal.    GERD: She takes OTC ranitidine for treatment.    History of hypertension: She has been treated for HTN in the past but has been taken off all medications for this.    History of C diff: Likely due to frequent antibiotics for suspected UTIs. Did not require hospitalization. Was treated as an outpatient with flagyl.\    Medications: CoQ10 100 mg capsules, take 2 capsules by mouth once daily. Fish oil 1000 mcg tablet, one tablet by  mouth once daily. Fruit of the Garden probiotic 2 times daily. D-Mannose 500 mg daily for urinary health. Multivitamin daily.     Review of Systems   Constitutional: Negative for activity change and unexpected weight change.   HENT: Negative for hearing loss, rhinorrhea and trouble swallowing.    Eyes: Negative for discharge and visual disturbance.   Respiratory: Negative for chest tightness and wheezing.    Cardiovascular: Negative for chest pain and palpitations.   Gastrointestinal: Negative for blood in stool, constipation, diarrhea and vomiting.   Endocrine: Negative for polydipsia and polyuria.   Genitourinary: Negative for difficulty urinating, dysuria, hematuria and menstrual problem.   Musculoskeletal: Negative for arthralgias, joint swelling and neck pain.   Neurological: Negative for weakness and headaches.   Psychiatric/Behavioral: Negative for confusion and dysphoric mood.       Objective:      Physical Exam   Constitutional: She is oriented to person, place, and time. She appears well-developed and well-nourished. No distress.   HENT:   Head: Normocephalic and atraumatic.   Right Ear: Tympanic membrane, external ear and ear canal normal.   Left Ear: Tympanic membrane, external ear and ear canal normal.   Nose: Nose normal.   Mouth/Throat: Oropharynx is clear and moist. No oropharyngeal exudate.   Cardiovascular: Normal rate, regular rhythm, normal heart sounds and intact distal pulses.  Exam reveals no gallop and no friction rub.    No murmur heard.  Pulmonary/Chest: Effort normal and breath sounds normal. No respiratory distress. She has no wheezes. She has no rales.   Musculoskeletal: She exhibits no edema or deformity.   Neurological: She is alert and oriented to person, place, and time.   Skin: Skin is warm and dry. She is not diaphoretic.   Psychiatric: She has a normal mood and affect. Her behavior is normal. Judgment and thought content normal.   Vitals reviewed.      Assessment:       1.  Encounter to establish care    2. Gastroesophageal reflux disease, esophagitis presence not specified    3. History of hypertension    4. H/O Clostridium difficile infection    5. Osteopenia, unspecified location        Plan:     1. Encounter to establish care  -Patient plans to continue to follow with PCP in [place] for all health maintenance    2. GERD  -Continue OTC ranitidine    3. History of HTN  -BP today 136/78. Monitor on subsequent visits    4. H/O C diff infection  -Discussed with patient the importance of letting providers know about this history before getting any antibiotics; she understands    5. Osteopenia  -I have reviewed her labs from December. Ca 9.4 and vit D 77.8. Not on vitamin D currently  -She plans to have repeat labs with PCP in June for follow up    RTC PRN

## 2018-03-08 ENCOUNTER — HOSPITAL ENCOUNTER (OUTPATIENT)
Dept: RADIOLOGY | Facility: HOSPITAL | Age: 83
Discharge: HOME | End: 2018-03-08
Attending: STUDENT IN AN ORGANIZED HEALTH CARE EDUCATION/TRAINING PROGRAM
Payer: MEDICARE

## 2018-03-08 DIAGNOSIS — N39.0 RECURRENT UTI: ICD-10-CM

## 2018-03-08 PROCEDURE — 76770 US EXAM ABDO BACK WALL COMP: CPT | Mod: 26,,, | Performed by: RADIOLOGY

## 2018-03-08 PROCEDURE — 76770 US EXAM ABDO BACK WALL COMP: CPT | Mod: TC

## 2018-03-09 ENCOUNTER — OFFICE VISIT (OUTPATIENT)
Dept: UROLOGY | Facility: CLINIC | Age: 83
End: 2018-03-09
Payer: MEDICARE

## 2018-03-09 VITALS — BODY MASS INDEX: 27.42 KG/M2 | WEIGHT: 149 LBS | HEIGHT: 62 IN

## 2018-03-09 DIAGNOSIS — N39.0 URINARY TRACT INFECTION WITHOUT HEMATURIA, SITE UNSPECIFIED: Primary | ICD-10-CM

## 2018-03-09 LAB
BILIRUB UR QL STRIP: NEGATIVE
CLARITY UR REFRACT.AUTO: ABNORMAL
COLOR UR AUTO: YELLOW
GLUCOSE UR QL STRIP: NEGATIVE
HGB UR QL STRIP: NEGATIVE
KETONES UR QL STRIP: NEGATIVE
LEUKOCYTE ESTERASE UR QL STRIP: NEGATIVE
MICROSCOPIC COMMENT: NORMAL
NITRITE UR QL STRIP: NEGATIVE
PH UR STRIP: 5 [PH] (ref 5–8)
PROT UR QL STRIP: NEGATIVE
RBC #/AREA URNS AUTO: 0 /HPF (ref 0–4)
SP GR UR STRIP: 1.01 (ref 1–1.03)
URN SPEC COLLECT METH UR: ABNORMAL
UROBILINOGEN UR STRIP-ACNC: NEGATIVE EU/DL
WBC #/AREA URNS AUTO: 0 /HPF (ref 0–5)

## 2018-03-09 PROCEDURE — 99999 PR PBB SHADOW E&M-EST. PATIENT-LVL III: CPT | Mod: PBBFAC,,, | Performed by: STUDENT IN AN ORGANIZED HEALTH CARE EDUCATION/TRAINING PROGRAM

## 2018-03-09 PROCEDURE — 51702 INSERT TEMP BLADDER CATH: CPT | Mod: S$PBB,,, | Performed by: STUDENT IN AN ORGANIZED HEALTH CARE EDUCATION/TRAINING PROGRAM

## 2018-03-09 PROCEDURE — 99213 OFFICE O/P EST LOW 20 MIN: CPT | Mod: PBBFAC,PO | Performed by: STUDENT IN AN ORGANIZED HEALTH CARE EDUCATION/TRAINING PROGRAM

## 2018-03-09 PROCEDURE — 51702 INSERT TEMP BLADDER CATH: CPT | Mod: PBBFAC,PO | Performed by: STUDENT IN AN ORGANIZED HEALTH CARE EDUCATION/TRAINING PROGRAM

## 2018-03-09 PROCEDURE — 87086 URINE CULTURE/COLONY COUNT: CPT

## 2018-03-09 PROCEDURE — 99213 OFFICE O/P EST LOW 20 MIN: CPT | Mod: 25,S$PBB,, | Performed by: STUDENT IN AN ORGANIZED HEALTH CARE EDUCATION/TRAINING PROGRAM

## 2018-03-09 PROCEDURE — 81001 URINALYSIS AUTO W/SCOPE: CPT

## 2018-03-09 NOTE — PROGRESS NOTES
"Subjective:       Patient ID: Tristin Weeks is a 85 y.o. female.    Chief Complaint: Other (results)      This is a 85 y.o.  female patient that is an established patient of mine.  The patient is referred to me by Dr. Anastasiya Escobedo for recurrent UTIs.  She is here today with her daughter Ms. Black (we had her sign permission today so if we are unable to reach the patient or if she doesn't understand, we have her permission to contact her daughter). She reports a history of urinary tract infections, approximately 2 per year. She states that her UTI symptoms- she "feels awful", frequency, denies dysuria, denies gross hematuria, her daughter describes that she is more combative and agitated when she may have a UTI. The patient reports that when she "begins to feel bad" she lets it "go on for awhile until it gets really bad and I want to die." She has a difficult time describing what symptoms escalate to the point of that severe level of bother. She believes when it is that severe she has a headache, difficulty walking, and feels like her legs are weak. Her daughter fills in the gaps and informed me that sometimes her mom becomes very agitated, combative, and has short term memory loss without recollection of their interactions or conversations.   She has been evaluated by Dr. Andino at [facility]. She was prescribed estrogen cream but stopped it because she "didn't like it." She feels like she recalls that it caused some local itchiness. She was also referred to pelvic floor therapy and feels like this therapy did help. She was told she has a cystocele but was not recommended any intervention for this and was told it was mild per the patient.     She drinks a cup of coffee every morning, then some water possibly 2 cups of water during the day.     Pt reports she cannot give us a urine specimen as she just urinated at Dr. Escobedo's.   US about 1 year ago. Her daughter feels like she recalls no other finding other than the " "kidneys were "smaller"    She moved to [place] from [place]. She lives in [place] for 6 months and in [place] for the other 6 months.   She is here today with her daughter - Sofia.     Urine culture history from outside reports (SCANNED INTO MEDIA TODAY)  3/7/17 - mixed microbial population, no predominating organisms, probable contaminants  The next 4 urine culture results were obtained from a [employer] printout by the patient's daughter - no sensitivity results available  9/27/17 - <10K CFU multiple organisms, probably contaminants  9/27/17 - 50-100K CFU klebsiella oxytoca  11/2/17 - no growth  12/18/17 - >100K Raoultella ornithinolytica  (per outside ID consultant, this is a gram negative organism that used to be classified as klebsiella)  [facility] result:  2/20/18 - Raoultella planticola >100K - sensitive to: amox/clav, cefepime, ceftriaxone, cefuroxime, cephalothin, cipro, gent, imipenem, nitrofurantoin, tetracycline, tobramycin, bactrim; resistant to: ampicillin, piperacillin      They return back today 3/9/18 to followup her renal ultrasound results and for a catheterized urine to be obtained by me to send for a urine culture.     Review of records from [facility] and Dr. Andino's office-   Pt was recommended estrace cream.   UTI treated with cipro and levaquin in 6/2017.   Saw Dr. Andino 3/6/17for UTIs   3/7/17 - mixed microbial population, no predominating organisms; probable contaminants.   3/8/17 - insufficient growth   Pelvic floor physical therapy 3/16/17     Renal ultrasound (report only) - 3/9/17 - right kidney 9 x 4.8 x 4.8cm. No hydro or stones noted.   Left kidney - 9.4 x 4.1 x 5.4cm, subjective cortical atrophy. No hydro or stones noted.   Serum Cr 0.85 6/8/17     Urine culture 7/6/17 - klebsiella >100K sensitive to all except bactrim.     I personally reviewed the images: renal ultrasound 3/8/18 -The right kidney measures 8.8 cm in length.  There is no hydronephrosis.  The resistive index within " a parenchymal artery is 0.6.    The left kidney measures 9.8 cm in length.  There is no hydronephrosis. The resistive index within a parenchymal artery is 0.7.  The bladder is unremarkable. The post void residual is 22 mL.      ---  Past Medical History:   Diagnosis Date    C. difficile colitis     Frequent UTI     GERD (gastroesophageal reflux disease)     Hiatal hernia     Hypertension        Past Surgical History:   Procedure Laterality Date    APPENDECTOMY         Family History   Problem Relation Age of Onset    Prostate cancer Neg Hx     Kidney disease Neg Hx        Social History   Substance Use Topics    Smoking status: Former Smoker    Smokeless tobacco: Never Used    Alcohol use No       No current outpatient prescriptions on file prior to visit.     No current facility-administered medications on file prior to visit.        Review of patient's allergies indicates:   Allergen Reactions    Amoxicillin Nausea Only     Upsets stomach     Norvasc [amlodipine] Edema    Prednisone Nausea Only    Premarin [conjugated estrogens] Other (See Comments)     Causes Dizziness    Lisinopril Other (See Comments)     cough       Review of Systems   Constitutional: Negative for activity change.   HENT: Negative for congestion.    Eyes: Negative for visual disturbance.   Respiratory: Negative for shortness of breath.    Cardiovascular: Negative for chest pain.   Gastrointestinal: Negative for abdominal distention.   Musculoskeletal: Negative for gait problem.   Skin: Negative for color change.   Neurological: Negative for dizziness.   Psychiatric/Behavioral: Negative for agitation.       Objective:      Physical Exam   Constitutional: She is oriented to person, place, and time. She appears well-developed.   HENT:   Head: Normocephalic and atraumatic.   Eyes: EOM are normal.   Neck: Normal range of motion.   Cardiovascular: Intact distal pulses.    Pulmonary/Chest: Effort normal.   Abdominal: Soft. She  exhibits no distension. There is no tenderness.   Genitourinary:   Genitourinary Comments: Very mild cystocele, minimal descent on valsalva.  Normal urethral meatus, catheterized without any difficulty. Clear yellow urine collected via the catheter and sent off for culture. The in and out catheter was then removed.   Musculoskeletal: Normal range of motion.   Neurological: She is alert and oriented to person, place, and time.   Skin: Skin is warm and dry.   Psychiatric: She has a normal mood and affect.       Assessment:       1. Urinary tract infection without hematuria, site unspecified        Plan:       1. Reviewed renal ultrasound results - benign findings. No kidney stones, hydronephrosis bilaterally. Her residual is very low 22cc.   2. Discussed premarin cream reiniation, patient would like to hold off for now.  3. Discussed infectious disease referral, patient and her daughter would like to hold off for now and consider if she demonstrates another urinary tract infection with an unusual bacteria.  4. Patient will work on frequent, timed, and double voiding. She will continue to hydrate herself well.  5. Will send catheterized urine for culture, I will call her with results.  6. Counseled patient and her daughter if she should ever feel like she is developing a UTI she needs to come in for a catheterized urine sample to be obtained. Upon reviewing her medical records in the past it was evidence that there were many contaminated specimens.       Urinary tract infection without hematuria, site unspecified  -     Urine culture  -     Urinalysis  -     Urinalysis Microscopic

## 2018-03-11 LAB — BACTERIA UR CULT: NO GROWTH

## 2018-03-12 ENCOUNTER — TELEPHONE (OUTPATIENT)
Dept: UROLOGY | Facility: CLINIC | Age: 83
End: 2018-03-12

## 2018-03-12 NOTE — TELEPHONE ENCOUNTER
----- Message from Tamanna Farrar MD sent at 3/12/2018  8:46 AM CDT -----  Please call patient and notify of negative urine culture. The urine culture did not grow out bacteria in the urine concerning for a urinary tract infection (UTI), therefore no antibiotics are needed.

## 2018-03-28 ENCOUNTER — OFFICE VISIT (OUTPATIENT)
Dept: INTERNAL MEDICINE | Facility: CLINIC | Age: 83
End: 2018-03-28
Payer: MEDICARE

## 2018-03-28 VITALS
TEMPERATURE: 98 F | HEART RATE: 96 BPM | SYSTOLIC BLOOD PRESSURE: 120 MMHG | DIASTOLIC BLOOD PRESSURE: 70 MMHG | OXYGEN SATURATION: 96 %

## 2018-03-28 DIAGNOSIS — R05.9 COUGH: ICD-10-CM

## 2018-03-28 DIAGNOSIS — N39.0 UTI DUE TO KLEBSIELLA SPECIES: ICD-10-CM

## 2018-03-28 DIAGNOSIS — R39.89 SUSPECTED UTI: Primary | ICD-10-CM

## 2018-03-28 DIAGNOSIS — B96.89 UTI DUE TO KLEBSIELLA SPECIES: ICD-10-CM

## 2018-03-28 LAB
AMORPH CRY UR QL COMP ASSIST: NORMAL
BACTERIA #/AREA URNS AUTO: NORMAL /HPF
BILIRUB UR QL STRIP: NEGATIVE
CLARITY UR REFRACT.AUTO: CLEAR
COLOR UR AUTO: YELLOW
GLUCOSE UR QL STRIP: NEGATIVE
HGB UR QL STRIP: NEGATIVE
KETONES UR QL STRIP: NEGATIVE
LEUKOCYTE ESTERASE UR QL STRIP: ABNORMAL
MICROSCOPIC COMMENT: NORMAL
NITRITE UR QL STRIP: NEGATIVE
PH UR STRIP: 5 [PH] (ref 5–8)
PROT UR QL STRIP: NEGATIVE
SP GR UR STRIP: 1 (ref 1–1.03)
URN SPEC COLLECT METH UR: ABNORMAL
UROBILINOGEN UR STRIP-ACNC: NEGATIVE EU/DL
WBC #/AREA URNS AUTO: 1 /HPF (ref 0–5)

## 2018-03-28 PROCEDURE — 87088 URINE BACTERIA CULTURE: CPT

## 2018-03-28 PROCEDURE — 87086 URINE CULTURE/COLONY COUNT: CPT

## 2018-03-28 PROCEDURE — 99214 OFFICE O/P EST MOD 30 MIN: CPT | Mod: S$PBB,,, | Performed by: INTERNAL MEDICINE

## 2018-03-28 PROCEDURE — 99999 PR PBB SHADOW E&M-EST. PATIENT-LVL IV: CPT | Mod: PBBFAC,,, | Performed by: INTERNAL MEDICINE

## 2018-03-28 PROCEDURE — 87186 SC STD MICRODIL/AGAR DIL: CPT

## 2018-03-28 PROCEDURE — 81001 URINALYSIS AUTO W/SCOPE: CPT

## 2018-03-28 PROCEDURE — 99214 OFFICE O/P EST MOD 30 MIN: CPT | Mod: PBBFAC,PO | Performed by: INTERNAL MEDICINE

## 2018-03-28 PROCEDURE — 87077 CULTURE AEROBIC IDENTIFY: CPT

## 2018-03-28 RX ORDER — FLUTICASONE PROPIONATE 50 MCG
1 SPRAY, SUSPENSION (ML) NASAL DAILY
Qty: 1 BOTTLE | Refills: 0 | Status: SHIPPED | OUTPATIENT
Start: 2018-03-28 | End: 2018-04-30

## 2018-03-28 NOTE — PATIENT INSTRUCTIONS
Fluticasone nasal spray  What is this medicine?  FLUTICASONE (floo TIK a sone) is a corticosteroid. This medicine is used to treat the symptoms of allergies like sneezing, itchy red eyes, and itchy, runny, or stuffy nose.  How should I use this medicine?  This medicine is for use in the nose. Follow the directions on your product or prescription label. This medicine works best if used at regular intervals. Do not use more often than directed. Make sure that you are using your nasal spray correctly. After 6 months of daily use without a prescription, talk to your doctor or health care professional before using it for a longer time. Ask your doctor or health care professional if you have any questions.  Talk to your pediatrician regarding the use of this medicine in children. Special care may be needed. This medicine has been used in children as young as 2 years. After two months of daily use without a prescription in a child, talk to your pediatrician before using it for a longer time.  What side effects may I notice from receiving this medicine?  Side effects that you should report to your doctor or health care professional as soon as possible:  · allergic reactions like skin rash, itching or hives, swelling of the face, lips, or tongue  · changes in vision  · flu-like symptoms  · white patches or sores in the mouth or nose  Side effects that usually do not require medical attention (report to your doctor or health care professional if they continue or are bothersome):  · burning or irritation inside the nose or throat  · cough  · headache  · nosebleed  · unusual taste or smell  What may interact with this medicine?  · ketoconazole  · metyrapone  · some medicines for HIV  · vaccines  What if I miss a dose?  If you miss a dose, use it as soon as you remember. If it is almost time for your next dose, use only that dose and continue with your regular schedule. Do not use double or extra doses.  Where should I keep my  medicine?  Keep out of the reach of children.  Store at room temperature between 15 and 30 degrees C (59 and 86 degrees F). Throw away any unused medicine after the expiration date.  What should I tell my health care provider before I take this medicine?  They need to know if you have any of these conditions:  · infection, like tuberculosis, herpes, or fungal infection  · recent surgery on nose or sinuses  · taking corticosteroid by mouth  · an unusual or allergic reaction to fluticasone, steroids, other medicines, foods, dyes, or preservatives  · pregnant or trying to get pregnant  · breast-feeding  What should I watch for while using this medicine?  Visit your doctor or health care professional for regular checks on your progress. Some symptoms may improve within 12 hours after starting use. Check with your doctor or health care professional if there is no improvement in your condition after 3 weeks of use.  Do not come in contact with people who have chickenpox or the measles while you are taking this medicine. If you do, call your doctor right away.  NOTE:This sheet is a summary. It may not cover all possible information. If you have questions about this medicine, talk to your doctor, pharmacist, or health care provider. Copyright© 2017 [employer]

## 2018-03-28 NOTE — PROGRESS NOTES
"Subjective:       Patient ID: Tristin Weeks is a 85 y.o. female.    Chief Complaint: Urinary Tract Infection    HPI Mrs. Weeks is a 85 year old female who presents with a chief complaint of urinary tract infection.  Patient states that for the last 5-7 days she has felt a pressure-like sensation in her lower abdomen.She has been urinating frequently. And she often feels like she has to go urinate. She does not have difficulty starting a stream of urine and does feel that she completely empties her bladder after urinating. She denies any pain with urination. No blood in the urine. Her urine is "not very dark". She denies any fever. She has been drinking "lots".  She is also feeling very stressed.  She is wondering if this may be the reason she feels these symptoms.  She presents with her daughter today.    They are both aware that Dr. Tamanna Farrar (urology) wanted to see her if she had symptoms of urinary tract infection so that she could obtain a clean cath specimen for culture. However, Mrs. Weeks does not want to do a cath for specimen as this has caused her pain in the past. Neither she nor her daughter have told Dr. Farrar this. When they last spoke with Dr. Farrar, they did not want an infectious disease consult at that time.  However they have since changed their minds, and the daughter has her scheduled to see infectious disease soon.    She also complains today of nasal congestion and cough.  Her nose "gets plugged". She is often clearing her throat of mucous. She has been using an ALLERGY medicine that starts with an A or a Z. She cannot recall the name. But it is not helping to relieve her symptoms.    Review of Systems   Constitutional: Negative for fever.   HENT: Positive for congestion. Negative for ear pain, rhinorrhea and sore throat.    Respiratory: Positive for cough.    Genitourinary: Positive for frequency and urgency. Negative for dysuria and hematuria.       Objective:      Physical Exam "   Constitutional: She is oriented to person, place, and time. She appears well-developed and well-nourished. No distress.   HENT:   Head: Normocephalic.   Right Ear: External ear normal.   Left Ear: External ear normal.   Nose: Nose normal.   Mouth/Throat: Oropharynx is clear and moist. No oropharyngeal exudate.   Cardiovascular: Normal rate, regular rhythm, normal heart sounds and intact distal pulses.  Exam reveals no gallop and no friction rub.    No murmur heard.  Pulmonary/Chest: Effort normal and breath sounds normal. No respiratory distress. She has no wheezes. She has no rales.   Neurological: She is alert and oriented to person, place, and time.   Skin: Skin is warm and dry. She is not diaphoretic.   Psychiatric: She has a normal mood and affect. Her behavior is normal. Judgment and thought content normal.   Vitals reviewed.      Assessment:       1. Suspected UTI    2. Cough    3. UTI due to Klebsiella species        Plan:     #1 suspected urinary tract infection  Discussed with patient and her daughter that I recommend follow-up with urology.  However they do not wish to follow-up with urology at this time.  Therefore will check a urinalysis and urine culture.  However I will not start antibiotics unless the urinalysis is convincing for infection or urine culture results a specific bacteria due to her history of C. difficile infection.  Can use over-the-counter Azo for symptoms.  Continue to stay well hydrated.    #2 cough  Lungs are clear on exam today.  Cough is likely due to postnasal drip.  Prescribing Flonase 1 spray in nostril daily for treatment.    #3 urinary tract infection due to Klebsiella  Patient has a history of this.  She already has an appointment scheduled for infectious disease.  I have placed a consult for them today.    RTC PRN

## 2018-03-30 ENCOUNTER — NURSE TRIAGE (OUTPATIENT)
Dept: ADMINISTRATIVE | Facility: CLINIC | Age: 83
End: 2018-03-30

## 2018-03-30 LAB — BACTERIA UR CULT: NORMAL

## 2018-03-30 RX ORDER — CIPROFLOXACIN 500 MG/1
500 TABLET ORAL EVERY 12 HOURS
Qty: 14 TABLET | Refills: 0 | Status: SHIPPED | OUTPATIENT
Start: 2018-03-30 | End: 2018-04-06

## 2018-03-31 ENCOUNTER — NURSE TRIAGE (OUTPATIENT)
Dept: ADMINISTRATIVE | Facility: CLINIC | Age: 83
End: 2018-03-31

## 2018-03-31 NOTE — TELEPHONE ENCOUNTER
"  Reason for Disposition   [1] Prescription not at pharmacy AND [2] was prescribed today by PCP    Answer Assessment - Initial Assessment Questions  1. SYMPTOMS: "Do you have any symptoms?"      Refill of the cipro  2. SEVERITY: If symptoms are present, ask "Are they mild, moderate or severe?"    Protocols used: ST MEDICATION QUESTION CALL-A-    Patient's daughter called to report that the Cipro that was supposed to be called in to the [facility] pharmacy is not there this morning. Called the pharmacist PA to give the verbal order:     Prescribing Provider Encounter Provider   MD Richelle Foreman, RN   Medication Detail      Disp Refills Start End    ciprofloxacin HCl (CIPRO) 500 MG tablet 14 tablet 0 3/30/2018 4/6/2018    Sig - Route: Take 1 tablet (500 mg total) by mouth every 12 (twelve) hours. - Oral    Class: Phone In        "

## 2018-03-31 NOTE — TELEPHONE ENCOUNTER
"    Reason for Disposition   [1] POSITIVE urine test (i.e., NI + or LE + or WBC > 10) AND [2] NO standing order to call in prescription for antibiotic    Answer Assessment - Initial Assessment Questions  1. TEST: "Was it a urinalysis or a urine culture for UTI?"      Both were done  2. URINALYSIS RESULT: "Was it positive or negative?"  If positive, document what was positive (e.g., LE, WBC, RBC, Bacteria, Epithelial Cells)      Positive for trace leukocytes, culture shows>100,000 Klebsiella  3. FEVER: "Do you have a fever?" If so, ask: "What is your temperature, how was it measured, and when did it start?"      no  4. FLANK PAIN: "Do you have any pain in your side?"      no  5. PREGNANCY: "Is there any chance you are pregnant?" "When was your last menstrual period?"      na  6. PHENAZOPYRIDINE (Uristat, Pyridium): "Have you taken phenazopyridine (turns your urine orange) recently?"      Did not assess.    Caller states urine cx viewed on my [facility], pt is has irritability, urinary frequency, lower abdominal pressure/bladder pressure, decreased energy and decreased activity.  Caller wants to know if abx can be called in, instead of waiting until Monday?    Protocols used: ST URINALYSIS RESULTS FOLLOW-UP CALL-A-      "

## 2018-03-31 NOTE — TELEPHONE ENCOUNTER
daughter called on call nurse requesting abx as pt is having some irritability and bladder pressure. Sent in cipro based on ucx.

## 2018-04-24 ENCOUNTER — PATIENT MESSAGE (OUTPATIENT)
Dept: UROLOGY | Facility: CLINIC | Age: 83
End: 2018-04-24

## 2018-04-24 DIAGNOSIS — N39.0 URINARY TRACT INFECTION WITHOUT HEMATURIA, SITE UNSPECIFIED: Primary | ICD-10-CM

## 2018-04-26 ENCOUNTER — LAB VISIT (OUTPATIENT)
Dept: LAB | Facility: HOSPITAL | Age: 83
End: 2018-04-26
Attending: STUDENT IN AN ORGANIZED HEALTH CARE EDUCATION/TRAINING PROGRAM
Payer: MEDICARE

## 2018-04-26 DIAGNOSIS — N39.0 URINARY TRACT INFECTION WITHOUT HEMATURIA, SITE UNSPECIFIED: ICD-10-CM

## 2018-04-26 PROCEDURE — 87186 SC STD MICRODIL/AGAR DIL: CPT

## 2018-04-26 PROCEDURE — 87088 URINE BACTERIA CULTURE: CPT

## 2018-04-26 PROCEDURE — 87086 URINE CULTURE/COLONY COUNT: CPT

## 2018-04-26 PROCEDURE — 87077 CULTURE AEROBIC IDENTIFY: CPT

## 2018-04-27 ENCOUNTER — PATIENT MESSAGE (OUTPATIENT)
Dept: UROLOGY | Facility: CLINIC | Age: 83
End: 2018-04-27

## 2018-04-28 LAB — BACTERIA UR CULT: NORMAL

## 2018-04-30 ENCOUNTER — TELEPHONE (OUTPATIENT)
Dept: UROLOGY | Facility: CLINIC | Age: 83
End: 2018-04-30

## 2018-04-30 ENCOUNTER — OFFICE VISIT (OUTPATIENT)
Dept: INTERNAL MEDICINE | Facility: CLINIC | Age: 83
End: 2018-04-30
Payer: MEDICARE

## 2018-04-30 ENCOUNTER — LAB VISIT (OUTPATIENT)
Dept: LAB | Facility: HOSPITAL | Age: 83
End: 2018-04-30
Attending: FAMILY MEDICINE
Payer: MEDICARE

## 2018-04-30 VITALS
DIASTOLIC BLOOD PRESSURE: 80 MMHG | OXYGEN SATURATION: 99 % | SYSTOLIC BLOOD PRESSURE: 138 MMHG | TEMPERATURE: 97 F | HEART RATE: 76 BPM | HEIGHT: 62 IN | WEIGHT: 150.69 LBS | BODY MASS INDEX: 27.73 KG/M2

## 2018-04-30 DIAGNOSIS — N39.0 RECURRENT UTI (URINARY TRACT INFECTION): Primary | ICD-10-CM

## 2018-04-30 DIAGNOSIS — R53.1 WEAKNESS: ICD-10-CM

## 2018-04-30 DIAGNOSIS — R19.7 DIARRHEA, UNSPECIFIED TYPE: Chronic | ICD-10-CM

## 2018-04-30 DIAGNOSIS — R19.7 DIARRHEA, UNSPECIFIED TYPE: ICD-10-CM

## 2018-04-30 DIAGNOSIS — K90.41 GLUTEN INTOLERANCE: Chronic | ICD-10-CM

## 2018-04-30 DIAGNOSIS — K90.41 GLUTEN INTOLERANCE: ICD-10-CM

## 2018-04-30 LAB — IGA SERPL-MCNC: 71 MG/DL

## 2018-04-30 PROCEDURE — 83516 IMMUNOASSAY NONANTIBODY: CPT

## 2018-04-30 PROCEDURE — 82784 ASSAY IGA/IGD/IGG/IGM EACH: CPT

## 2018-04-30 PROCEDURE — 99213 OFFICE O/P EST LOW 20 MIN: CPT | Mod: PBBFAC,PO | Performed by: FAMILY MEDICINE

## 2018-04-30 PROCEDURE — 99999 PR PBB SHADOW E&M-EST. PATIENT-LVL III: CPT | Mod: PBBFAC,,, | Performed by: FAMILY MEDICINE

## 2018-04-30 PROCEDURE — 36415 COLL VENOUS BLD VENIPUNCTURE: CPT | Mod: PO

## 2018-04-30 PROCEDURE — 99214 OFFICE O/P EST MOD 30 MIN: CPT | Mod: S$PBB,,, | Performed by: FAMILY MEDICINE

## 2018-04-30 RX ORDER — SULFAMETHOXAZOLE AND TRIMETHOPRIM 800; 160 MG/1; MG/1
1 TABLET ORAL 2 TIMES DAILY
Qty: 28 TABLET | Refills: 0 | Status: SHIPPED | OUTPATIENT
Start: 2018-04-30 | End: 2018-05-07 | Stop reason: ALTCHOICE

## 2018-04-30 NOTE — TELEPHONE ENCOUNTER
----- Message from Tamanna Farrar MD sent at 4/30/2018  8:58 AM CDT -----  Ordering bactrim antibiotic. Will treat a little longer than the usual course of 7 days, will place on antibiotics for 2 weeks. Please call patient and notify of positive culture and antibiotics. The urine culture grew out bacteria concerning for a urinary tract infection.

## 2018-04-30 NOTE — PROGRESS NOTES
Subjective:       Patient ID: Tristin Weeks is a 85 y.o. female.    Chief Complaint: Urinary Tract Infection    85 year old female with recurrent UTIs, fatigue, and long-standing history of intermittent diarrhea here seeking an integrative medicine approach to care. She is cared for by a urologist in [place], where she lives with her daughter and son-in-law, and was recently prescribed Bactrim for a urinary tract infection. She has not started the antibiotic yet. She has been taking multiple supplements including alfalfa, d-mannitol, vitamin d3, a general probiotic. She has had c.difficile x 1 after treatment with cipro for recurrent UTI. She uses vaginal estrogen intermittently and has been over hygiene recommendations related to recurrent UTI. I reviewed her labs and see frequent infections with Klebsielle and enterococcus species.     Her daughter tells me that she has had more fatigue; she was walking most days and now is spending more time sitting. They both report increased eating of sugary foods as she has been baking quite a bit since moving from [place] to live with her daughter in [place]. She has frequent loose BMs and worsening UTI frequency since this change in her diet. She also reports a 10 pound weight gain. We discussed activity and perhaps trying something like yoga--they have just signed up with their local [facility] which offers classes for seniors, including yoga, that she and her daughter plan to do together.          does not have any pertinent problems on file.  Past Medical History:   Diagnosis Date    C. difficile colitis     Frequent UTI     GERD (gastroesophageal reflux disease)     Hiatal hernia     Hypertension      Past Surgical History:   Procedure Laterality Date    APPENDECTOMY       Family History   Problem Relation Age of Onset    Prostate cancer Neg Hx     Kidney disease Neg Hx      Social History     Social History    Marital status:      Spouse name: N/A    Number  of children: N/A    Years of education: N/A     Occupational History    Not on file.     Social History Main Topics    Smoking status: Former Smoker    Smokeless tobacco: Never Used    Alcohol use No    Drug use: No    Sexual activity: No     Other Topics Concern    Not on file     Social History Narrative    No narrative on file     Review of Systems   Constitutional: Positive for activity change. Negative for fatigue and unexpected weight change.   HENT: Positive for hearing loss. Negative for rhinorrhea, sore throat and trouble swallowing.    Eyes: Negative for pain, discharge and visual disturbance.   Respiratory: Negative for cough, chest tightness, shortness of breath and wheezing.    Cardiovascular: Negative for chest pain and palpitations.   Gastrointestinal: Negative for abdominal pain, blood in stool, constipation, diarrhea and vomiting.   Endocrine: Positive for polyuria. Negative for polydipsia.   Genitourinary: Negative for difficulty urinating, dysuria, hematuria, menstrual problem and vaginal discharge.   Musculoskeletal: Negative for arthralgias, joint swelling, myalgias and neck pain.   Skin: Negative for rash and wound.   Neurological: Positive for weakness. Negative for light-headedness and headaches.   Hematological: Negative.    Psychiatric/Behavioral: Positive for dysphoric mood. Negative for confusion.       Objective:      Physical Exam   Constitutional: She is oriented to person, place, and time. She appears well-developed and well-nourished. No distress.   HENT:   Head: Normocephalic and atraumatic.   Right Ear: External ear normal.   Left Ear: External ear normal.   Nose: Nose normal.   Eyes: Conjunctivae and EOM are normal. Pupils are equal, round, and reactive to light. No scleral icterus.   Neck: Normal range of motion. Neck supple. No thyromegaly present.   Cardiovascular: Normal rate, regular rhythm and normal heart sounds.  Exam reveals no friction rub.    No murmur  heard.  Pulmonary/Chest: Effort normal. No respiratory distress. She has no wheezes. She has rales.   Abdominal: Soft. Bowel sounds are normal. She exhibits no mass. There is tenderness. There is no rebound and no guarding.   Suprapubic tenderness   Musculoskeletal: Normal range of motion. She exhibits no edema or deformity.   Lymphadenopathy:     She has no cervical adenopathy.   Neurological: She is alert and oriented to person, place, and time. She displays normal reflexes. Coordination normal.   Normal gait, no assistive device. No speech abnormality. Decreased hearing.    Skin: Skin is warm and dry. She is not diaphoretic. No erythema. No pallor.   Psychiatric: She has a normal mood and affect. Her behavior is normal. Judgment and thought content normal.   Nursing note and vitals reviewed.      Assessment:       1. Recurrent UTI (urinary tract infection)    2. Diarrhea, unspecified type    3. Gluten intolerance    4. Weakness        Plan:       Recurrent UTI (urinary tract infection)  Comments:  Recommend taking full course of prescribed bactrim; Recommend starting UltraFlora Woman's for genitourinary tract health. Also recommend eliminating or limiting refined sugars and carbohydrates. Continue 1-2 L of water daily to flush out  tract. Continue estrogen therapy.     Diarrhea, unspecified type  Comments:  Stable symptoms; they are concerned about celiac disease and believe she has poor tolerance of gluten which she has been eating much more of lately. Check celiac panel today  Orders:  -     Tissue transglutaminase, IgA; Future; Expected date: 04/30/2018  -     IgA; Future; Expected date: 04/30/2018    Gluten intolerance  Comments:  Advised 3 week gluten free trial to evaluate whether this might be contributing to symptoms, regardless of blood test results.   Orders:  -     Tissue transglutaminase, IgA; Future; Expected date: 04/30/2018  -     IgA; Future; Expected date:  04/30/2018    Weakness  Comments:  Recommend daily activity such as walking, yoga, stretching, strength training in senior-focused classes/videos for improved energy and mood.  Blood work today for celiac disease. Consider 3 weeks trial of being gluten-free.  Decrease sugar intake, white bread, packaged foods, and refined carbohydrates.   Drink plenty of water everyday or herbal tea.  Start yoga and daily activity of any sort. Move it or lose it!     Start multivitamin and women's health probiotic. I like [employer] UltraFlora Women's.  Look into Whole Life Nutrition cookbook. It's on [employer].com for healthy recipes.

## 2018-04-30 NOTE — PATIENT INSTRUCTIONS
Blood work today for celiac disease. Consider 3 weeks trial of being gluten-free.  Decrease sugar intake, white bread, packaged foods, and refined carbohydrates.   Drink plenty of water everyday or herbal tea.  Start yoga and daily activity of any sort. Move it or lose it!     Start multivitamin and women's health probiotic. I like [employer].   Look into Whole Life Nutrition cookbook. It's on [employer].com for healthy recipes.

## 2018-04-30 NOTE — TELEPHONE ENCOUNTER
Spoke with Ms sotomayor and gave her the results of her urine culture, she voiced her understanding and said she would start taking the bactrim today.

## 2018-05-02 ENCOUNTER — TELEPHONE (OUTPATIENT)
Dept: INTERNAL MEDICINE | Facility: CLINIC | Age: 83
End: 2018-05-02

## 2018-05-02 LAB — TTG IGA SER IA-ACNC: 3 UNITS

## 2018-05-02 NOTE — TELEPHONE ENCOUNTER
----- Message from Franny Aguiar MD sent at 5/2/2018  1:49 PM CDT -----  Blood work is negative for celiac disease. Note sent to portal for her review.

## 2018-05-07 ENCOUNTER — OFFICE VISIT (OUTPATIENT)
Dept: INFECTIOUS DISEASES | Facility: CLINIC | Age: 83
End: 2018-05-07
Payer: MEDICARE

## 2018-05-07 VITALS
HEART RATE: 103 BPM | HEIGHT: 63 IN | DIASTOLIC BLOOD PRESSURE: 92 MMHG | BODY MASS INDEX: 26.37 KG/M2 | SYSTOLIC BLOOD PRESSURE: 146 MMHG | WEIGHT: 148.81 LBS | TEMPERATURE: 98 F

## 2018-05-07 DIAGNOSIS — N39.0 RECURRENT UTI: Primary | ICD-10-CM

## 2018-05-07 DIAGNOSIS — Z86.19 HISTORY OF CLOSTRIDIUM DIFFICILE COLITIS: ICD-10-CM

## 2018-05-07 PROCEDURE — 99213 OFFICE O/P EST LOW 20 MIN: CPT | Mod: PBBFAC | Performed by: INTERNAL MEDICINE

## 2018-05-07 PROCEDURE — 99204 OFFICE O/P NEW MOD 45 MIN: CPT | Mod: S$PBB,,, | Performed by: INTERNAL MEDICINE

## 2018-05-07 PROCEDURE — 99999 PR PBB SHADOW E&M-EST. PATIENT-LVL III: CPT | Mod: PBBFAC,,, | Performed by: INTERNAL MEDICINE

## 2018-05-07 NOTE — PROGRESS NOTES
Subjective:      Patient ID: Tristin Weeks is a 85 y.o. female.    Chief Complaint:Urinary Tract Infection      History of Present Illness  85-year-old female presents for evaluation of recurrent UTI.  Over the last 4 years, patient has had recurrent UTIs. Since January, patient has had 3 UTIs.  Symptoms consistent with her UTI includes bladder pressure, urinary frequency, fatigue, and irritability. She denied having any fevers, chills, CVA tendenress, hematuria, dysuria.  Her symptoms resolve after a course of antibiotics.  This year, she has had 2 courses of ciprofloxacin, now currently on a 14-day course of TMP-SMX.  Patient currently fells nauseous with abdominal discomfort on TMP-SMX.  She has been having diarrhea every other day.  She is taking probiotics.  She has had an episode of C. Difficile about 5 years ago.  She was being evaluated by urology - ultrasound with no evidence of urinary tract obstruction.  Patient has had pelvic floor therapy in the past, but she does not keep up with her exercises.  She drinks about 4-5 cups of water per day.    Review of Systems   Constitution: Positive for malaise/fatigue. Negative for chills, decreased appetite, fever, weakness, night sweats, weight gain and weight loss.   HENT: Negative for congestion, ear pain, hearing loss, hoarse voice, sore throat and tinnitus.    Eyes: Negative for blurred vision, redness and visual disturbance.   Cardiovascular: Negative for chest pain, leg swelling and palpitations.   Respiratory: Negative for cough, hemoptysis, shortness of breath and sputum production.    Hematologic/Lymphatic: Negative for adenopathy. Does not bruise/bleed easily.   Skin: Negative for dry skin, itching, rash and suspicious lesions.   Musculoskeletal: Negative for back pain, joint pain, myalgias and neck pain.   Gastrointestinal: Positive for diarrhea and nausea. Negative for abdominal pain, constipation, heartburn and vomiting.   Genitourinary: Positive for  "frequency. Negative for dysuria, flank pain, hematuria, hesitancy and urgency.   Neurological: Positive for dizziness. Negative for headaches, numbness and paresthesias.   Psychiatric/Behavioral: Negative for depression and memory loss. The patient does not have insomnia and is not nervous/anxious.      Objective:   Physical Exam   Constitutional: She is oriented to person, place, and time. She appears well-developed and well-nourished. No distress.   HENT:   Head: Normocephalic and atraumatic.   Eyes: Conjunctivae and EOM are normal.   Neck: Normal range of motion. Neck supple.   Pulmonary/Chest: Effort normal. No respiratory distress.   Abdominal: Soft. She exhibits no distension.   Musculoskeletal: Normal range of motion. She exhibits no edema.   Neurological: She is alert and oriented to person, place, and time.   Skin: Skin is warm and dry. No rash noted. She is not diaphoretic. No erythema.   Psychiatric: She has a normal mood and affect. Her behavior is normal.   Vitals reviewed.    Significant labs reviewed:   Klebsiella pneumoniae    CULTURE, URINE    Amox/K Clav'ate <=8/4 "><=8/4  Sensitive    Amp/Sulbactam <=8/4 "><=8/4  Sensitive    Cefazolin <=8 "><=8  Sensitive    Cefepime <=8 "><=8  Sensitive    Ceftriaxone <=8 "><=8  Sensitive    Ciprofloxacin <=1 "><=1  Sensitive    Ertapenem <=2 "><=2  Sensitive    Gentamicin <=4 "><=4  Sensitive    Meropenem <=4 "><=4  Sensitive    Nitrofurantoin <=32 "><=32  Sensitive    Piperacillin/Tazo <=16 "><=16  Sensitive    Tetracycline <=4 "><=4  Sensitive    Tobramycin <=4 "><=4  Sensitive    Trimeth/Sulfa <=2/38 "><=2/38  Sensitive         3/2018  Retroperitoneal US  Findings: The right kidney measures 8.8 cm in length.  There is no hydronephrosis.  The resistive index within a parenchymal artery is 0.6.      The left kidney measures 9.8 cm in length.  There is no hydronephrosis. The resistive index within a parenchymal artery is 0.7.    The bladder is unremarkable. The " post void residual is 22 mL.   Impression       The post void residual is 22 mL.       Assessment:   85-year-old female  - Recurrent UTI  - History of C. Difficile    Plan:   - Discontinue TMP-SMX, received sufficient course for treatment of UTI  - When starting to have increased urinary frequency with fatigue and irritability, patient with start increasing fluid intake and also drop off urinalysis and urine culture  - If no improvement in symptoms after 48 hours, will initiate antibiotics based on urine culture ID and sensitivities  - Advised increasing overall fluid intake to facilitate flushing urinary system  - Continue ingesting live cultures with yogurt products    Marianela Lewis MD MPH  Infectious Diseases [facility]

## 2018-05-31 ENCOUNTER — LAB VISIT (OUTPATIENT)
Dept: LAB | Facility: HOSPITAL | Age: 83
End: 2018-05-31
Attending: INTERNAL MEDICINE
Payer: MEDICARE

## 2018-05-31 ENCOUNTER — TELEPHONE (OUTPATIENT)
Dept: INFECTIOUS DISEASES | Facility: HOSPITAL | Age: 83
End: 2018-05-31

## 2018-05-31 DIAGNOSIS — N39.0 RECURRENT UTI: Primary | ICD-10-CM

## 2018-05-31 DIAGNOSIS — N39.0 RECURRENT UTI: ICD-10-CM

## 2018-05-31 LAB
BACTERIA #/AREA URNS AUTO: ABNORMAL /HPF
BILIRUB UR QL STRIP: NEGATIVE
CLARITY UR REFRACT.AUTO: CLEAR
COLOR UR AUTO: YELLOW
GLUCOSE UR QL STRIP: NEGATIVE
HGB UR QL STRIP: NEGATIVE
KETONES UR QL STRIP: NEGATIVE
LEUKOCYTE ESTERASE UR QL STRIP: ABNORMAL
MICROSCOPIC COMMENT: ABNORMAL
NITRITE UR QL STRIP: POSITIVE
PH UR STRIP: 6 [PH] (ref 5–8)
PROT UR QL STRIP: NEGATIVE
RBC #/AREA URNS AUTO: 0 /HPF (ref 0–4)
SP GR UR STRIP: 1.01 (ref 1–1.03)
URN SPEC COLLECT METH UR: ABNORMAL
UROBILINOGEN UR STRIP-ACNC: NEGATIVE EU/DL
WBC #/AREA URNS AUTO: 8 /HPF (ref 0–5)

## 2018-05-31 PROCEDURE — 87086 URINE CULTURE/COLONY COUNT: CPT

## 2018-05-31 PROCEDURE — 87088 URINE BACTERIA CULTURE: CPT

## 2018-05-31 PROCEDURE — 87186 SC STD MICRODIL/AGAR DIL: CPT

## 2018-05-31 PROCEDURE — 87077 CULTURE AEROBIC IDENTIFY: CPT

## 2018-05-31 PROCEDURE — 81001 URINALYSIS AUTO W/SCOPE: CPT

## 2018-06-01 ENCOUNTER — PATIENT MESSAGE (OUTPATIENT)
Dept: INFECTIOUS DISEASES | Facility: CLINIC | Age: 83
End: 2018-06-01

## 2018-06-01 ENCOUNTER — TELEPHONE (OUTPATIENT)
Dept: INFECTIOUS DISEASES | Facility: CLINIC | Age: 83
End: 2018-06-01

## 2018-06-01 DIAGNOSIS — N39.0 RECURRENT UTI: Primary | ICD-10-CM

## 2018-06-01 RX ORDER — GRANULES FOR ORAL 3 G/1
3 POWDER ORAL
Qty: 9 G | Refills: 0 | Status: SHIPPED | OUTPATIENT
Start: 2018-06-01 | End: 2018-06-08

## 2018-06-01 RX ORDER — SULFAMETHOXAZOLE AND TRIMETHOPRIM 800; 160 MG/1; MG/1
1 TABLET ORAL 2 TIMES DAILY
Qty: 6 TABLET | Refills: 0 | Status: SHIPPED | OUTPATIENT
Start: 2018-06-01 | End: 2018-06-04

## 2018-06-02 LAB — BACTERIA UR CULT: NORMAL

## 2018-06-13 ENCOUNTER — PATIENT MESSAGE (OUTPATIENT)
Dept: INFECTIOUS DISEASES | Facility: CLINIC | Age: 83
End: 2018-06-13

## 2018-06-13 ENCOUNTER — TELEPHONE (OUTPATIENT)
Dept: INFECTIOUS DISEASES | Facility: CLINIC | Age: 83
End: 2018-06-13

## 2018-06-13 NOTE — TELEPHONE ENCOUNTER
Patient taken 2 fosfomycin pill - still with irritability and malaise.  Patient unable to express how she is doing.  She is having about one episode of diarrhea a day on fosfomycin.  Advised patient and daughter to complete last fosfomycin pill.  Also with new folliculitis on arm - advised to place warm compress to allow for release of purulence.  Daughter will update tomorrow about patient progress.

## 2018-06-14 ENCOUNTER — PATIENT MESSAGE (OUTPATIENT)
Dept: INFECTIOUS DISEASES | Facility: CLINIC | Age: 83
End: 2018-06-14

## 2018-06-15 ENCOUNTER — TELEPHONE (OUTPATIENT)
Dept: INFECTIOUS DISEASES | Facility: CLINIC | Age: 83
End: 2018-06-15

## 2018-06-15 DIAGNOSIS — N39.0 RECURRENT UTI: Primary | ICD-10-CM

## 2018-06-15 NOTE — TELEPHONE ENCOUNTER
Patient still feeling unwell despite 3 doses of fosfomycin, unclear if related to persistent UTI or side effect from fosfomycin.    Order placed for urinalysis and urine culture.    Patient daughter will  urine cup and order for urinalysis and urine culture.

## 2018-08-20 ENCOUNTER — PATIENT MESSAGE (OUTPATIENT)
Dept: INFECTIOUS DISEASES | Facility: CLINIC | Age: 83
End: 2018-08-20

## 2018-10-19 ENCOUNTER — OFFICE VISIT (OUTPATIENT)
Dept: INTERNAL MEDICINE | Facility: CLINIC | Age: 83
End: 2018-10-19
Payer: MEDICARE

## 2018-10-19 ENCOUNTER — LAB VISIT (OUTPATIENT)
Dept: LAB | Facility: HOSPITAL | Age: 83
End: 2018-10-19
Attending: FAMILY MEDICINE
Payer: MEDICARE

## 2018-10-19 VITALS
WEIGHT: 149.06 LBS | BODY MASS INDEX: 26.41 KG/M2 | TEMPERATURE: 98 F | DIASTOLIC BLOOD PRESSURE: 70 MMHG | HEART RATE: 102 BPM | HEIGHT: 63 IN | OXYGEN SATURATION: 95 % | SYSTOLIC BLOOD PRESSURE: 128 MMHG

## 2018-10-19 DIAGNOSIS — R79.82 ELEVATED C-REACTIVE PROTEIN (CRP): ICD-10-CM

## 2018-10-19 DIAGNOSIS — R79.82 ELEVATED C-REACTIVE PROTEIN (CRP): Primary | ICD-10-CM

## 2018-10-19 DIAGNOSIS — M25.50 ARTHRALGIA, UNSPECIFIED JOINT: ICD-10-CM

## 2018-10-19 LAB
BASOPHILS # BLD AUTO: 0.09 K/UL
BASOPHILS NFR BLD: 0.8 %
DIFFERENTIAL METHOD: ABNORMAL
EOSINOPHIL # BLD AUTO: 0.3 K/UL
EOSINOPHIL NFR BLD: 3 %
ERYTHROCYTE [DISTWIDTH] IN BLOOD BY AUTOMATED COUNT: 14.4 %
ERYTHROCYTE [SEDIMENTATION RATE] IN BLOOD BY WESTERGREN METHOD: 10 MM/HR
HCT VFR BLD AUTO: 45.1 %
HGB BLD-MCNC: 14.5 G/DL
IMM GRANULOCYTES # BLD AUTO: 0.08 K/UL
IMM GRANULOCYTES NFR BLD AUTO: 0.7 %
LYMPHOCYTES # BLD AUTO: 3 K/UL
LYMPHOCYTES NFR BLD: 26.3 %
MCH RBC QN AUTO: 28.2 PG
MCHC RBC AUTO-ENTMCNC: 32.2 G/DL
MCV RBC AUTO: 88 FL
MONOCYTES # BLD AUTO: 0.9 K/UL
MONOCYTES NFR BLD: 7.5 %
NEUTROPHILS # BLD AUTO: 7.1 K/UL
NEUTROPHILS NFR BLD: 61.7 %
NRBC BLD-RTO: 0 /100 WBC
PLATELET # BLD AUTO: 254 K/UL
PMV BLD AUTO: 12.3 FL
RBC # BLD AUTO: 5.15 M/UL
WBC # BLD AUTO: 11.42 K/UL

## 2018-10-19 PROCEDURE — 99213 OFFICE O/P EST LOW 20 MIN: CPT | Mod: PBBFAC,PO | Performed by: FAMILY MEDICINE

## 2018-10-19 PROCEDURE — 85025 COMPLETE CBC W/AUTO DIFF WBC: CPT

## 2018-10-19 PROCEDURE — 36415 COLL VENOUS BLD VENIPUNCTURE: CPT | Mod: PO

## 2018-10-19 PROCEDURE — 99214 OFFICE O/P EST MOD 30 MIN: CPT | Mod: S$PBB,,, | Performed by: FAMILY MEDICINE

## 2018-10-19 PROCEDURE — 99999 PR PBB SHADOW E&M-EST. PATIENT-LVL III: CPT | Mod: PBBFAC,,, | Performed by: FAMILY MEDICINE

## 2018-10-19 PROCEDURE — 85651 RBC SED RATE NONAUTOMATED: CPT | Mod: PO

## 2018-10-19 RX ORDER — OMEGA-3-ACID ETHYL ESTERS 1 G/1
2 CAPSULE, LIQUID FILLED ORAL 2 TIMES DAILY
COMMUNITY
End: 2019-05-03

## 2018-10-19 NOTE — PROGRESS NOTES
Subjective:       Patient ID: Tristin Weeks is a 86 y.o. female.    Chief Complaint: Follow-up (Abnormal blood work)    85 yo female here with her daughter with concern about elevated WBC count and ESR. She has seen her primary care doctor in [place] recently as well as urologist who stated cath urine culture was negative and suspects +U/A results with leukocytes have been due to contaminant. She has a GYN appointment on November--no bleeding or discharge.       Otalgia    There is pain in the left ear. This is a recurrent problem. The current episode started more than 1 month ago. The problem occurs every few hours. The problem has been waxing and waning. There has been no fever. The pain is at a severity of 2/10. The pain is mild. Associated symptoms include coughing, diarrhea, hearing loss and neck pain. Pertinent negatives include no abdominal pain, drainage, ear discharge, headaches, rash, rhinorrhea, sore throat or vomiting. She has tried nothing for the symptoms. The treatment provided no relief. Her past medical history is significant for a chronic ear infection, hearing loss and a tympanostomy tube.     does not have any pertinent problems on file.  Past Medical History:   Diagnosis Date    C. difficile colitis     Frequent UTI     GERD (gastroesophageal reflux disease)     Hiatal hernia     Hypertension      Past Surgical History:   Procedure Laterality Date    APPENDECTOMY       Family History   Problem Relation Age of Onset    Prostate cancer Neg Hx     Kidney disease Neg Hx      Social History     Socioeconomic History    Marital status:      Spouse name: Not on file    Number of children: Not on file    Years of education: Not on file    Highest education level: Not on file   Social Needs    Financial resource strain: Not on file    Food insecurity - worry: Not on file    Food insecurity - inability: Not on file    Transportation needs - medical: Not on file    Transportation  needs - non-medical: Not on file   Occupational History    Not on file   Tobacco Use    Smoking status: Former Smoker    Smokeless tobacco: Never Used   Substance and Sexual Activity    Alcohol use: No    Drug use: No    Sexual activity: No   Other Topics Concern    Not on file   Social History Narrative    Not on file     Review of Systems   HENT: Positive for ear pain and hearing loss. Negative for ear discharge, rhinorrhea and sore throat.    Respiratory: Positive for cough.    Gastrointestinal: Positive for diarrhea. Negative for abdominal pain and vomiting.   Musculoskeletal: Positive for neck pain.   Skin: Negative for rash.   Neurological: Negative for headaches.       Objective:     Vitals:    10/19/18 1112   BP: 128/70   Pulse: 102   Temp: 97.9 °F (36.6 °C)        Physical Exam   Constitutional: She is oriented to person, place, and time. She appears well-developed and well-nourished.   HENT:   Head: Normocephalic and atraumatic.   Right Ear: External ear normal. Tympanic membrane is not injected, not scarred, not perforated and not erythematous. No middle ear effusion.   Left Ear: External ear normal. Tympanic membrane is not injected, not scarred, not perforated and not erythematous.  No middle ear effusion.   Nose: Nose normal.   Eyes: Conjunctivae and EOM are normal. Pupils are equal, round, and reactive to light. No scleral icterus.   Neurological: She is alert and oriented to person, place, and time.   Normal gait. No speech abnormality   Psychiatric: She has a normal mood and affect. Her behavior is normal. Judgment and thought content normal.   Nursing note and vitals reviewed.      Assessment:       1. Elevated C-reactive protein (CRP)    2. Arthralgia, unspecified joint        Plan:           Problem List Items Addressed This Visit     None      Visit Diagnoses     Elevated C-reactive protein (CRP)    -  Primary    Relevant Orders    CBC auto differential (Completed)    Sedimentation rate  (Completed)    Arthralgia, unspecified joint        C/o pain to neck and hands      Flu shot given already this year; taking herbal anti-inflammatories and eating less sugar. Will repeat labs. If joint pains worsen will consider referral.

## 2018-11-05 ENCOUNTER — OFFICE VISIT (OUTPATIENT)
Dept: OBSTETRICS AND GYNECOLOGY | Facility: CLINIC | Age: 83
End: 2018-11-05
Attending: OBSTETRICS & GYNECOLOGY
Payer: MEDICARE

## 2018-11-05 VITALS
WEIGHT: 149.13 LBS | DIASTOLIC BLOOD PRESSURE: 84 MMHG | SYSTOLIC BLOOD PRESSURE: 132 MMHG | BODY MASS INDEX: 26.42 KG/M2 | HEIGHT: 63 IN

## 2018-11-05 DIAGNOSIS — N39.0 RECURRENT UTI: Primary | ICD-10-CM

## 2018-11-05 PROCEDURE — 99203 OFFICE O/P NEW LOW 30 MIN: CPT | Mod: S$PBB,,, | Performed by: OBSTETRICS & GYNECOLOGY

## 2018-11-05 PROCEDURE — 99213 OFFICE O/P EST LOW 20 MIN: CPT | Mod: PBBFAC | Performed by: OBSTETRICS & GYNECOLOGY

## 2018-11-05 PROCEDURE — 99999 PR PBB SHADOW E&M-EST. PATIENT-LVL III: CPT | Mod: PBBFAC,,, | Performed by: OBSTETRICS & GYNECOLOGY

## 2018-11-05 RX ORDER — NITROFURANTOIN MACROCRYSTALS 50 MG/1
50 CAPSULE ORAL NIGHTLY
Qty: 30 CAPSULE | Refills: 3 | Status: SHIPPED | OUTPATIENT
Start: 2018-11-05 | End: 2019-01-04

## 2018-11-05 RX ORDER — AMOXICILLIN 250 MG/1
250 CAPSULE ORAL 3 TIMES DAILY
COMMUNITY
End: 2019-05-03

## 2018-11-05 RX ORDER — ESTRADIOL 0.1 MG/G
CREAM VAGINAL
Qty: 42.5 G | Refills: 0 | Status: SHIPPED | OUTPATIENT
Start: 2018-11-05

## 2018-11-05 NOTE — PROGRESS NOTES
"Subjective:       Patient ID: Tristin Weeks is a 86 y.o. female.    Chief Complaint:  Establish Care (recurrent uti's )      History of Present Illness  - patient presents with c/o frequent UTIs. She is currently finishing a week of Amoxicillin prescribed by Dr. Benson due to elevated WBCs on CBC and elevated CRP. Her urine culture from last Thursday is pending. She has an appt with Dr. Benson on Thursday to discuss results.  - patient has a small known cystocele and has received pelvic floor PT in the past.  - she reports that she has had multiple episodes of suprapubic pressure, small amount of burning when voiding, and incontinence; she has been documented to have a UTI in many of these episodes. She denies vaginal discharge/bleeding and hematuria during these episodes. When this occurs, she says she feels "awful." In the past, Dr. Andino gave her a tube of estrogen cream that she used for a short period of time but discontinued because she didn't think it helped and it someimtes burned a little.  - patient is here with her daughter Sofia Palomino. Sofia was able to pull up patient's recent labs that showed a normal creatinine level and normal GFR.  - patient and daughter are wondering if a small amount of nitrofurantoin taken daily may help prevent UTIs.    Past Medical History:   Diagnosis Date    C. difficile colitis     Frequent UTI     GERD (gastroesophageal reflux disease)     Hiatal hernia     Hypertension        Past Surgical History:   Procedure Laterality Date    APPENDECTOMY           Current Outpatient Medications:     amoxicillin (AMOXIL) 250 MG capsule, Take 250 mg by mouth 3 (three) times daily., Disp: , Rfl:     Lactobac no.41/Bifidobact no.7 (PROBIOTIC-10 ORAL), Take by mouth., Disp: , Rfl:     multivitamin capsule, Take 1 capsule by mouth once daily., Disp: , Rfl:     omega-3 acid ethyl esters (LOVAZA) 1 gram capsule, Take 2 g by mouth 2 (two) times daily., Disp: , Rfl:     THEANINE " ORAL, Take by mouth., Disp: , Rfl:     estradiol (ESTRACE) 0.01 % (0.1 mg/gram) vaginal cream, Rub pea-sized amount near urethral orifice every night x 2 weeks then three times weekly., Disp: 42.5 g, Rfl: 0    nitrofurantoin (MACRODANTIN) 50 MG capsule, Take 1 capsule (50 mg total) by mouth every evening., Disp: 30 capsule, Rfl: 3    Review of patient's allergies indicates:   Allergen Reactions    Norvasc [amlodipine] Edema    Premarin [conjugated estrogens] Other (See Comments)     Causes Dizziness    Lisinopril Other (See Comments)     cough       GYN & OB History  No LMP recorded. Patient is postmenopausal.   Date of Last Pap: No result found    OB History    Para Term  AB Living   3 3 3     3   SAB TAB Ectopic Multiple Live Births           3      # Outcome Date GA Lbr Ta/2nd Weight Sex Delivery Anes PTL Lv   3 Term      Vag-Spont   ANIBAL   2 Term      Vag-Spont   ANIBAL   1 Term      Vag-Spont   ANIBAL          Social History     Socioeconomic History    Marital status:      Spouse name: Not on file    Number of children: Not on file    Years of education: Not on file    Highest education level: Not on file   Social Needs    Financial resource strain: Not on file    Food insecurity - worry: Not on file    Food insecurity - inability: Not on file    Transportation needs - medical: Not on file    Transportation needs - non-medical: Not on file   Occupational History    Not on file   Tobacco Use    Smoking status: Former Smoker    Smokeless tobacco: Never Used   Substance and Sexual Activity    Alcohol use: No    Drug use: No    Sexual activity: No   Other Topics Concern    Not on file   Social History Narrative    Not on file       Family History   Problem Relation Age of Onset    Colon cancer Maternal Aunt     Prostate cancer Neg Hx     Kidney disease Neg Hx     Breast cancer Neg Hx     Ovarian cancer Neg Hx        Review of Systems  Review of Systems   Respiratory:  "Negative for shortness of breath.    Cardiovascular: Negative for chest pain and palpitations.   Gastrointestinal: Negative for blood in stool, nausea and vomiting.   Genitourinary:        - see HPI   Skin: Negative for rash and wound.   Allergic/Immunologic: Negative for immunocompromised state.   Neurological: Negative for dizziness and syncope.   Hematological: Negative for adenopathy.   Psychiatric/Behavioral: Negative for behavioral problems.        Objective:     Vitals:    11/05/18 1418   BP: 132/84   Weight: 67.7 kg (149 lb 2.3 oz)   Height: 5' 3" (1.6 m)       Physical Exam:   Constitutional: She appears well-developed and well-nourished. She is cooperative. No distress.             Abdominal: Soft. Normal appearance. There is no tenderness.     Genitourinary: Vagina normal and uterus normal. There is no rash, tenderness or lesion on the right labia. There is no rash, tenderness or lesion on the left labia. Cervix is normal. Right adnexum displays no mass, no tenderness and no fullness. Left adnexum displays no mass, no tenderness and no fullness.   Genitourinary Comments: Atrophic. Very minimal cystocele. No lesions seen.               Neurological: She is alert.          Assessment/ Plan:     Orders Placed This Encounter    nitrofurantoin (MACRODANTIN) 50 MG capsule    estradiol (ESTRACE) 0.01 % (0.1 mg/gram) vaginal cream       Jewel was seen today for establish care.    Diagnoses and all orders for this visit:    Recurrent UTI  -     nitrofurantoin (MACRODANTIN) 50 MG capsule; Take 1 capsule (50 mg total) by mouth every evening.    Other orders  -     estradiol (ESTRACE) 0.01 % (0.1 mg/gram) vaginal cream; Rub pea-sized amount near urethral orifice every night x 2 weeks then three times weekly.    - d/w Dr. Benson; her urine culture grew out E. Coli that is sensitive to Amoxicillin. Patient will finish that Rx today. I discussed my plan with him: Estrace cream at the urethral orifice and " nitrofurantoin 50 mg at bedtime. He agrees with the plan. D/w Sofia (patient's daughter) and sent in Rx. They will call me for f/u when Rx runs out or if has issues with recurrent UTI. Verbalized understanding.      Follow-up if symptoms worsen or fail to improve.

## 2018-11-16 ENCOUNTER — LAB VISIT (OUTPATIENT)
Dept: LAB | Facility: HOSPITAL | Age: 83
End: 2018-11-16
Attending: OBSTETRICS & GYNECOLOGY
Payer: MEDICARE

## 2018-11-16 ENCOUNTER — PATIENT MESSAGE (OUTPATIENT)
Dept: OBSTETRICS AND GYNECOLOGY | Facility: CLINIC | Age: 83
End: 2018-11-16

## 2018-11-16 DIAGNOSIS — R39.9 UTI SYMPTOMS: Primary | ICD-10-CM

## 2018-11-16 DIAGNOSIS — R39.9 UTI SYMPTOMS: ICD-10-CM

## 2018-11-16 PROCEDURE — 87077 CULTURE AEROBIC IDENTIFY: CPT

## 2018-11-16 PROCEDURE — 87186 SC STD MICRODIL/AGAR DIL: CPT

## 2018-11-16 PROCEDURE — 87088 URINE BACTERIA CULTURE: CPT

## 2018-11-16 PROCEDURE — 87086 URINE CULTURE/COLONY COUNT: CPT

## 2018-11-16 NOTE — TELEPHONE ENCOUNTER
Agree with plan. If patient feels that symptoms are progressively worsening over the weekend and she didn't see Dr. Benson today, should go to Urgent Care or ER over the weekend. Please ask daughter to let us know how she's doing on Monday morning. Likely will consider increasing nitrofurantoin to 100 mg daily if this next culture is positive.

## 2018-11-18 LAB — BACTERIA UR CULT: NORMAL

## 2018-11-19 ENCOUNTER — TELEPHONE (OUTPATIENT)
Dept: OBSTETRICS AND GYNECOLOGY | Facility: CLINIC | Age: 83
End: 2018-11-19

## 2018-11-19 ENCOUNTER — PATIENT MESSAGE (OUTPATIENT)
Dept: OBSTETRICS AND GYNECOLOGY | Facility: CLINIC | Age: 83
End: 2018-11-19

## 2018-11-19 RX ORDER — NITROFURANTOIN 25; 75 MG/1; MG/1
100 CAPSULE ORAL 2 TIMES DAILY
Qty: 14 CAPSULE | Refills: 0 | Status: SHIPPED | OUTPATIENT
Start: 2018-11-19 | End: 2019-01-04

## 2018-11-19 NOTE — TELEPHONE ENCOUNTER
Spoke with patient's daughter. Will increase macrobid to 100 mg bid x 7 days. She will message me Monday to let me know how she's doing. Plan for repeat urine culture in 2 weeks. Will increase macrobid prophylaxis to 100 mg nightly. Sofia verbalized understanding.

## 2018-11-27 RX ORDER — NITROFURANTOIN 25; 75 MG/1; MG/1
100 CAPSULE ORAL NIGHTLY
Qty: 30 CAPSULE | Refills: 3 | Status: SHIPPED | OUTPATIENT
Start: 2018-11-27 | End: 2019-01-04

## 2018-11-27 NOTE — TELEPHONE ENCOUNTER
Daughter called and informed me that her mom is doing good after finishing the macrobid.  Rx pended for Macrobid 100mg po nightly based on last conversation with Dr. Portillo.  She will also come in for a repeat urine culture in 2wks.

## 2018-12-05 ENCOUNTER — TELEPHONE (OUTPATIENT)
Dept: OBSTETRICS AND GYNECOLOGY | Facility: CLINIC | Age: 83
End: 2018-12-05

## 2018-12-05 ENCOUNTER — LAB VISIT (OUTPATIENT)
Dept: LAB | Facility: HOSPITAL | Age: 83
End: 2018-12-05
Attending: OBSTETRICS & GYNECOLOGY
Payer: MEDICARE

## 2018-12-05 DIAGNOSIS — N39.0 URINARY TRACT INFECTION WITHOUT HEMATURIA, SITE UNSPECIFIED: Primary | ICD-10-CM

## 2018-12-05 DIAGNOSIS — N39.0 URINARY TRACT INFECTION WITHOUT HEMATURIA, SITE UNSPECIFIED: ICD-10-CM

## 2018-12-05 PROCEDURE — 87088 URINE BACTERIA CULTURE: CPT

## 2018-12-05 PROCEDURE — 87086 URINE CULTURE/COLONY COUNT: CPT

## 2018-12-05 PROCEDURE — 87077 CULTURE AEROBIC IDENTIFY: CPT

## 2018-12-05 PROCEDURE — 87186 SC STD MICRODIL/AGAR DIL: CPT

## 2018-12-05 NOTE — TELEPHONE ENCOUNTER
Pt's daughter calling for order for urine culture so pt can have it done this afternoon at [facility].

## 2018-12-07 ENCOUNTER — OFFICE VISIT (OUTPATIENT)
Dept: UROLOGY | Facility: CLINIC | Age: 83
End: 2018-12-07
Payer: MEDICARE

## 2018-12-07 ENCOUNTER — PATIENT MESSAGE (OUTPATIENT)
Dept: OBSTETRICS AND GYNECOLOGY | Facility: CLINIC | Age: 83
End: 2018-12-07

## 2018-12-07 VITALS
HEIGHT: 63 IN | HEART RATE: 105 BPM | WEIGHT: 149 LBS | BODY MASS INDEX: 26.4 KG/M2 | DIASTOLIC BLOOD PRESSURE: 88 MMHG | SYSTOLIC BLOOD PRESSURE: 148 MMHG

## 2018-12-07 DIAGNOSIS — N39.0 RECURRENT UTI: Primary | ICD-10-CM

## 2018-12-07 PROCEDURE — 51701 INSERT BLADDER CATHETER: CPT | Mod: PBBFAC,PO | Performed by: STUDENT IN AN ORGANIZED HEALTH CARE EDUCATION/TRAINING PROGRAM

## 2018-12-07 PROCEDURE — 87088 URINE BACTERIA CULTURE: CPT

## 2018-12-07 PROCEDURE — 99999 PR PBB SHADOW E&M-EST. PATIENT-LVL III: CPT | Mod: PBBFAC,,, | Performed by: STUDENT IN AN ORGANIZED HEALTH CARE EDUCATION/TRAINING PROGRAM

## 2018-12-07 PROCEDURE — 99213 OFFICE O/P EST LOW 20 MIN: CPT | Mod: PBBFAC,PO | Performed by: STUDENT IN AN ORGANIZED HEALTH CARE EDUCATION/TRAINING PROGRAM

## 2018-12-07 PROCEDURE — 81001 URINALYSIS AUTO W/SCOPE: CPT

## 2018-12-07 PROCEDURE — 87186 SC STD MICRODIL/AGAR DIL: CPT

## 2018-12-07 PROCEDURE — 87077 CULTURE AEROBIC IDENTIFY: CPT

## 2018-12-07 PROCEDURE — 99214 OFFICE O/P EST MOD 30 MIN: CPT | Mod: S$PBB,,, | Performed by: STUDENT IN AN ORGANIZED HEALTH CARE EDUCATION/TRAINING PROGRAM

## 2018-12-07 PROCEDURE — 87086 URINE CULTURE/COLONY COUNT: CPT

## 2018-12-07 RX ORDER — AMOXICILLIN AND CLAVULANATE POTASSIUM 875; 125 MG/1; MG/1
1 TABLET, FILM COATED ORAL 2 TIMES DAILY
Qty: 14 TABLET | Refills: 0 | Status: SHIPPED | OUTPATIENT
Start: 2018-12-07 | End: 2018-12-14

## 2018-12-07 NOTE — TELEPHONE ENCOUNTER
Spoke with daughter and informed that we received the preliminary urine culture report. Daughter states her mom is taking Macrobid daily. Also she is unable to take Bactrim it makes her inn.  I updated her allergies.  Will send in Augmentin 875mg bid.  Dr Portillo spoke with Dr. Farrar and the office will be calling her to schedule a follow up visit for her mom.  Informed pt we will touch base with her once we get the final urine culture report.  Daughter verbalized understanding.

## 2018-12-07 NOTE — TELEPHONE ENCOUNTER
----- Message from Franny Portillo MD sent at 12/7/2018 11:23 AM CST -----  Attempted to reach patient's daughter. Needs treatment for UTI. Is taking Macrobid 100 mg daily prophylaxis and still having UTIs that are sensitive to Macrobid. Will reach out to Dr. Tamanna Farrar, as she has seen patient in the past for this issue. If daughter calls back, recommend treatment with Bactrim DS bid x 7 days. Please be sure to ask daughter if patient has had issues with that antibiotic in the past.

## 2018-12-07 NOTE — PROGRESS NOTES
"Subjective:       Patient ID: Tristin Weeks is a 86 y.o. female.    Chief Complaint: recurrent infections  This is a 86 y.o.  female patient  She was originally referred to me by Dr. Anastasiya Escobedo for recurrent UTIs.  She is here today with her daughter Ms. Black (we had her sign permission today so if we are unable to reach the patient or if she doesn't understand, we have her permission to contact her daughter). She reports a history of urinary tract infections, approximately 2 per year. She states that her UTI symptoms- she "feels awful", frequency, denies dysuria, denies gross hematuria, her daughter describes that she is more combative and agitated when she may have a UTI. The patient reports that when she "begins to feel bad" she lets it "go on for awhile until it gets really bad and I want to die." She has a difficult time describing what symptoms escalate to the point of that severe level of bother. She believes when it is that severe she has a headache, difficulty walking, and feels like her legs are weak. Her daughter fills in the gaps and informed me that sometimes her mom becomes very agitated, combative, and has short term memory loss without recollection of their interactions or conversations.   She has been evaluated by Dr. Andino at [facility]. She was prescribed estrogen cream but stopped it because she "didn't like it." She feels like she recalls that it caused some local itchiness. She was also referred to pelvic floor therapy and feels like this therapy did help. She was told she has a cystocele but was not recommended any intervention for this and was told it was mild per the patient.   She drinks a cup of coffee every morning, then some water possibly 2 cups of water during the day.   US about 1 year ago. Her daughter feels like she recalls no other finding other than the kidneys were "smaller"  She moved to [place] from [place]. She lives in [place] for 6 months and in [place] for the " other 6 months.   She is here today with her daughter - Sofia.   Urine culture history from outside reports (SCANNED INTO MEDIA at initial visit)  3/7/17 - mixed microbial population, no predominating organisms, probable contaminants  The next 4 urine culture results were obtained from a [employer] printout by the patient's daughter - no sensitivity results available  9/27/17 - <10K CFU multiple organisms, probably contaminants  9/27/17 - 50-100K CFU klebsiella oxytoca  11/2/17 - no growth  12/18/17 - >100K Raoultella ornithinolytica  (per outside ID consultant, this is a gram negative organism that used to be classified as klebsiella)  [facility] result:  2/20/18 - Raoultella planticola >100K - sensitive to: amox/clav, cefepime, ceftriaxone, cefuroxime, cephalothin, cipro, gent, imipenem, nitrofurantoin, tetracycline, tobramycin, bactrim; resistant to: ampicillin, piperacillin    They return back today 3/9/18 to followup her renal ultrasound results and for a catheterized urine to be obtained by me to send for a urine culture.     Review of records from [facility] and Dr. Andino's office-   Pt was recommended estrace cream.   UTI treated with cipro and levaquin in 6/2017.   Saw Dr. Andino 3/6/17for UTIs   3/7/17 - mixed microbial population, no predominating organisms; probable contaminants.   3/8/17 - insufficient growth   Pelvic floor physical therapy 3/16/17     Renal ultrasound (report only) - 3/9/17 - right kidney 9 x 4.8 x 4.8cm. No hydro or stones noted.   Left kidney - 9.4 x 4.1 x 5.4cm, subjective cortical atrophy. No hydro or stones noted.   Serum Cr 0.85 6/8/17     Urine culture 7/6/17 - klebsiella >100K sensitive to all except bactrim.     I personally reviewed the images: renal ultrasound 3/8/18 -The right kidney measures 8.8 cm in length.  There is no hydronephrosis.  The resistive index within a parenchymal artery is 0.6.    The left kidney measures 9.8 cm in length.  There is no hydronephrosis. The resistive index  "within a parenchymal artery is 0.7.  The bladder is unremarkable. The post void residual is 22 mL.    12/7/18-  She saw Dr. Portillo, starting 11/5/18 she was maintained on a prophylactic antibiotic dose of macrobid 50mg daily. On 11/19/18 per Dr. Portillo's note "Will increase macrobid to 100 mg bid x 7 days. Will increase macrobid prophylaxis to 100 mg nightly"  Because of klebsiella UTI >100k on 11/16/18  Her hydration patterns have not changed since we last met. She drinks 2 cups of coffee daily. Drinks very little water.   Bowel movements - has a bowel movement every day. No straining or constipation.   Bmp from outside lab that her daughter was able to pull up 9/2018- serum creatinine 0.86  Both the patient and her daughter voiced frustration with not getting any resolution to her UTI's.     ---  Past Medical History:   Diagnosis Date    C. difficile colitis     Frequent UTI     GERD (gastroesophageal reflux disease)     Hiatal hernia     Hypertension        Past Surgical History:   Procedure Laterality Date    APPENDECTOMY         Family History   Problem Relation Age of Onset    Colon cancer Maternal Aunt     Prostate cancer Neg Hx     Kidney disease Neg Hx     Breast cancer Neg Hx     Ovarian cancer Neg Hx        Social History     Tobacco Use    Smoking status: Former Smoker    Smokeless tobacco: Never Used   Substance Use Topics    Alcohol use: No    Drug use: No       Current Outpatient Medications on File Prior to Visit   Medication Sig Dispense Refill    amoxicillin (AMOXIL) 250 MG capsule Take 250 mg by mouth 3 (three) times daily.      amoxicillin-clavulanate 875-125mg (AUGMENTIN) 875-125 mg per tablet Take 1 tablet by mouth 2 (two) times daily. for 7 days 14 tablet 0    estradiol (ESTRACE) 0.01 % (0.1 mg/gram) vaginal cream Rub pea-sized amount near urethral orifice every night x 2 weeks then three times weekly. 42.5 g 0    Lactobac no.41/Bifidobact no.7 (PROBIOTIC-10 ORAL) Take " by mouth.      multivitamin capsule Take 1 capsule by mouth once daily.      nitrofurantoin (MACRODANTIN) 50 MG capsule Take 1 capsule (50 mg total) by mouth every evening. (Patient taking differently: Take 100 mg by mouth every evening. ) 30 capsule 3    nitrofurantoin, macrocrystal-monohydrate, (MACROBID) 100 MG capsule Take 1 capsule (100 mg total) by mouth 2 (two) times daily. 14 capsule 0    nitrofurantoin, macrocrystal-monohydrate, (MACROBID) 100 MG capsule Take 1 capsule (100 mg total) by mouth nightly. 30 capsule 3    omega-3 acid ethyl esters (LOVAZA) 1 gram capsule Take 2 g by mouth 2 (two) times daily.      THEANINE ORAL Take by mouth.       No current facility-administered medications on file prior to visit.        Review of patient's allergies indicates:   Allergen Reactions    Norvasc [amlodipine] Edema    Premarin [conjugated estrogens] Other (See Comments)     Causes Dizziness    Bactrim [sulfamethoxazole-trimethoprim]      Made pt feel weak     Lisinopril Other (See Comments)     cough       Review of Systems   Constitutional: Negative for activity change.   HENT: Negative for congestion.    Eyes: Negative for visual disturbance.   Respiratory: Negative for shortness of breath.    Cardiovascular: Negative for chest pain.   Gastrointestinal: Negative for abdominal distention.   Musculoskeletal: Negative for gait problem.   Skin: Negative for color change.   Neurological: Negative for dizziness.   Psychiatric/Behavioral: Negative for agitation.       Objective:      Physical Exam   Constitutional: She is oriented to person, place, and time. She appears well-developed.   HENT:   Head: Normocephalic and atraumatic.   Eyes: EOM are normal.   Neck: Normal range of motion.   Cardiovascular: Intact distal pulses.   Pulmonary/Chest: Effort normal.   Abdominal: Soft. She exhibits no distension. There is no tenderness.   Genitourinary:   Genitourinary Comments: Urethral meatus prepped with betadyne  "and in and out catheter inserted and drained clear yellow urine which will be sent for urinalysis and urine culture.   Musculoskeletal: Normal range of motion.   Neurological: She is alert and oriented to person, place, and time.   Skin: Skin is warm and dry.   Psychiatric: She has a normal mood and affect.       Assessment:       1. Recurrent UTI        Plan:       1. Discussed Cranberry supplements/tablets, probiotics which the patient and her daughter state they have tried an array of treatments in the past. She is taking lactobacillus. She has tried everything that she could think of without any improvement.   2. Offered the patient a cystoscopy to evaluate the bladder, the patient declined a cystoscopy.   3. Strengthened the recommendations that other physicians have recommended which include increasing hydration - 8 glasses of 16 oz of water.  4. Patient was amenable to a catheterized urine specimen today. The urine culture from 12/5/18 was a clean catch urine. They have not taken/started the augmentin that Dr. Portillo prescribed. The only thing she has taken was the prophylactic 100mg macrobid daily. Catheterized urine will be sent for UA and culture. They will start the augmentin that Dr. Portillo called in later today.   5. The patient expressed that she will "stick with me and see nobody else." I offered reassurance that should they decide to seek other medical opinions that is absolutely ok, as she has been suffering with these longstanding symptoms for many years and it seems that no progress has been made from the patient's perspective for over 30 years.         Recurrent UTI  -     Urinalysis  -     Urinalysis Microscopic  -     Urine culture      "

## 2018-12-08 LAB
BACTERIA #/AREA URNS AUTO: ABNORMAL /HPF
BACTERIA UR CULT: NORMAL
BILIRUB UR QL STRIP: NEGATIVE
CLARITY UR REFRACT.AUTO: CLEAR
COLOR UR AUTO: YELLOW
GLUCOSE UR QL STRIP: NEGATIVE
HGB UR QL STRIP: NEGATIVE
KETONES UR QL STRIP: NEGATIVE
LEUKOCYTE ESTERASE UR QL STRIP: ABNORMAL
MICROSCOPIC COMMENT: ABNORMAL
NITRITE UR QL STRIP: NEGATIVE
PH UR STRIP: 5 [PH] (ref 5–8)
PROT UR QL STRIP: NEGATIVE
RBC #/AREA URNS AUTO: 0 /HPF (ref 0–4)
SP GR UR STRIP: 1 (ref 1–1.03)
URN SPEC COLLECT METH UR: ABNORMAL
WBC #/AREA URNS AUTO: 1 /HPF (ref 0–5)

## 2018-12-10 LAB — BACTERIA UR CULT: NORMAL

## 2018-12-11 ENCOUNTER — TELEPHONE (OUTPATIENT)
Dept: UROLOGY | Facility: CLINIC | Age: 83
End: 2018-12-11

## 2018-12-11 ENCOUNTER — PATIENT MESSAGE (OUTPATIENT)
Dept: UROLOGY | Facility: CLINIC | Age: 83
End: 2018-12-11

## 2018-12-11 NOTE — TELEPHONE ENCOUNTER
----- Message from Yakelin Vargas sent at 12/11/2018  3:24 PM CST -----  Contact: [phone]/self  Pt would like a callback concerning changing medication for UTI, states she had a reaction. Please call and advise.

## 2018-12-12 ENCOUNTER — TELEPHONE (OUTPATIENT)
Dept: UROLOGY | Facility: CLINIC | Age: 83
End: 2018-12-12

## 2018-12-12 RX ORDER — CEFUROXIME AXETIL 500 MG/1
500 TABLET ORAL EVERY 12 HOURS
Qty: 14 TABLET | Refills: 0 | Status: SHIPPED | OUTPATIENT
Start: 2018-12-12 | End: 2018-12-19

## 2018-12-12 RX ORDER — TRIMETHOPRIM 100 MG/1
100 TABLET ORAL DAILY
Qty: 30 TABLET | Refills: 2 | Status: SHIPPED | OUTPATIENT
Start: 2018-12-12 | End: 2019-03-11 | Stop reason: SDUPTHER

## 2018-12-12 NOTE — TELEPHONE ENCOUNTER
Left a message for daughter to call us back on yesterday.  Telephone message completed.  Will respond via portal.

## 2018-12-12 NOTE — TELEPHONE ENCOUNTER
Patient not tolerating Augmentin prescribed by Dr. Portillo.  Ceftin x 1 week to treat UTI  Trimethoprim 100mg daily for suppressive antibiotics for 3 months, after the 3 months will monitor for return of UTI.

## 2019-01-04 ENCOUNTER — PATIENT MESSAGE (OUTPATIENT)
Dept: UROLOGY | Facility: CLINIC | Age: 84
End: 2019-01-04

## 2019-01-04 ENCOUNTER — OFFICE VISIT (OUTPATIENT)
Dept: UROLOGY | Facility: CLINIC | Age: 84
End: 2019-01-04
Payer: MEDICARE

## 2019-01-04 VITALS
BODY MASS INDEX: 26.4 KG/M2 | SYSTOLIC BLOOD PRESSURE: 156 MMHG | HEIGHT: 63 IN | HEART RATE: 100 BPM | DIASTOLIC BLOOD PRESSURE: 100 MMHG | TEMPERATURE: 98 F | WEIGHT: 149 LBS

## 2019-01-04 DIAGNOSIS — N39.0 RECURRENT UTI: Primary | ICD-10-CM

## 2019-01-04 DIAGNOSIS — R30.0 DYSURIA: ICD-10-CM

## 2019-01-04 DIAGNOSIS — R35.0 URINE FREQUENCY: ICD-10-CM

## 2019-01-04 DIAGNOSIS — R39.15 URINARY URGENCY: ICD-10-CM

## 2019-01-04 LAB
BACTERIA #/AREA URNS AUTO: NORMAL /HPF
BILIRUB SERPL-MCNC: NORMAL MG/DL
BILIRUB UR QL STRIP: NEGATIVE
BLOOD URINE, POC: NORMAL
CLARITY UR REFRACT.AUTO: CLEAR
COLOR UR AUTO: NORMAL
COLOR, POC UA: YELLOW
GLUCOSE UR QL STRIP: NEGATIVE
GLUCOSE UR QL STRIP: NORMAL
HGB UR QL STRIP: NEGATIVE
KETONES UR QL STRIP: NEGATIVE
KETONES UR QL STRIP: NORMAL
LEUKOCYTE ESTERASE UR QL STRIP: NEGATIVE
LEUKOCYTE ESTERASE URINE, POC: NORMAL
MICROSCOPIC COMMENT: NORMAL
NITRITE UR QL STRIP: NEGATIVE
NITRITE, POC UA: NORMAL
PH UR STRIP: 6 [PH] (ref 5–8)
PH, POC UA: 5
PROT UR QL STRIP: NEGATIVE
PROTEIN, POC: NORMAL
SP GR UR STRIP: 1 (ref 1–1.03)
SPECIFIC GRAVITY, POC UA: 1.01
URN SPEC COLLECT METH UR: NORMAL
UROBILINOGEN, POC UA: NORMAL
WBC #/AREA URNS AUTO: 0 /HPF (ref 0–5)

## 2019-01-04 PROCEDURE — 99214 OFFICE O/P EST MOD 30 MIN: CPT | Mod: PBBFAC,PO,25 | Performed by: STUDENT IN AN ORGANIZED HEALTH CARE EDUCATION/TRAINING PROGRAM

## 2019-01-04 PROCEDURE — 99999 PR PBB SHADOW E&M-EST. PATIENT-LVL IV: ICD-10-PCS | Mod: PBBFAC,,, | Performed by: STUDENT IN AN ORGANIZED HEALTH CARE EDUCATION/TRAINING PROGRAM

## 2019-01-04 PROCEDURE — 87086 URINE CULTURE/COLONY COUNT: CPT

## 2019-01-04 PROCEDURE — 81001 URINALYSIS AUTO W/SCOPE: CPT

## 2019-01-04 PROCEDURE — 99999 PR PBB SHADOW E&M-EST. PATIENT-LVL IV: CPT | Mod: PBBFAC,,, | Performed by: STUDENT IN AN ORGANIZED HEALTH CARE EDUCATION/TRAINING PROGRAM

## 2019-01-04 PROCEDURE — 99214 PR OFFICE/OUTPT VISIT, EST, LEVL IV, 30-39 MIN: ICD-10-PCS | Mod: S$PBB,,, | Performed by: STUDENT IN AN ORGANIZED HEALTH CARE EDUCATION/TRAINING PROGRAM

## 2019-01-04 PROCEDURE — 81002 URINALYSIS NONAUTO W/O SCOPE: CPT | Mod: PBBFAC,PO | Performed by: STUDENT IN AN ORGANIZED HEALTH CARE EDUCATION/TRAINING PROGRAM

## 2019-01-04 PROCEDURE — 99214 OFFICE O/P EST MOD 30 MIN: CPT | Mod: S$PBB,,, | Performed by: STUDENT IN AN ORGANIZED HEALTH CARE EDUCATION/TRAINING PROGRAM

## 2019-01-04 PROCEDURE — 51701 INSERT BLADDER CATHETER: CPT | Mod: PBBFAC,PO | Performed by: STUDENT IN AN ORGANIZED HEALTH CARE EDUCATION/TRAINING PROGRAM

## 2019-01-04 RX ORDER — CEFUROXIME AXETIL 500 MG/1
500 TABLET ORAL EVERY 12 HOURS
Qty: 14 TABLET | Refills: 0 | Status: SHIPPED | OUTPATIENT
Start: 2019-01-04 | End: 2019-01-11

## 2019-01-04 RX ORDER — METHENAMINE HIPPURATE 1000 MG/1
1 TABLET ORAL 2 TIMES DAILY
Qty: 180 TABLET | Refills: 3 | Status: SHIPPED | OUTPATIENT
Start: 2019-01-04 | End: 2019-11-07 | Stop reason: SDUPTHER

## 2019-01-04 NOTE — PROGRESS NOTES
"Subjective:       Patient ID: Tristin Weeks is a 86 y.o. female.    Chief Complaint: Recurrent UTI  This is a 86 y.o.  female patient that is an established patient of mine.    She was originally referred to me by Dr. Anastasiya Esocbedo for recurrent UTIs.  She is here today with her daughter Ms. Black (we had her sign permission today so if we are unable to reach the patient or if she doesn't understand, we have her permission to contact her daughter). She reports a history of urinary tract infections, approximately 2 per year. She states that her UTI symptoms- she "feels awful", frequency, denies dysuria, denies gross hematuria, her daughter describes that she is more combative and agitated when she may have a UTI. The patient reports that when she "begins to feel bad" she lets it "go on for awhile until it gets really bad and I want to die." She has a difficult time describing what symptoms escalate to the point of that severe level of bother. She believes when it is that severe she has a headache, difficulty walking, and feels like her legs are weak. Her daughter fills in the gaps and informed me that sometimes her mom becomes very agitated, combative, and has short term memory loss without recollection of their interactions or conversations.   She has been evaluated by Dr. Andino at [facility]. She was prescribed estrogen cream but stopped it because she "didn't like it." She feels like she recalls that it caused some local itchiness. She was also referred to pelvic floor therapy and feels like this therapy did help. She was told she has a cystocele but was not recommended any intervention for this and was told it was mild per the patient.   She drinks a cup of coffee every morning, then some water possibly 2 cups of water during the day.   US about 1 year ago. Her daughter feels like she recalls no other finding other than the kidneys were "smaller"  She moved to [place] from [place]. She lives in [place] for " 6 months and in [place] for the other 6 months.   She is here today with her daughter - Sofia.   Urine culture history from outside reports (SCANNED INTO MEDIA at initial visit)  3/7/17 - mixed microbial population, no predominating organisms, probable contaminants  The next 4 urine culture results were obtained from a [employer] printout by the patient's daughter - no sensitivity results available  9/27/17 - <10K CFU multiple organisms, probably contaminants  9/27/17 - 50-100K CFU klebsiella oxytoca  11/2/17 - no growth  12/18/17 - >100K Raoultella ornithinolytica  (per outside ID consultant, this is a gram negative organism that used to be classified as klebsiella)  [facility] result:  2/20/18 - Raoultella planticola >100K - sensitive to: amox/clav, cefepime, ceftriaxone, cefuroxime, cephalothin, cipro, gent, imipenem, nitrofurantoin, tetracycline, tobramycin, bactrim; resistant to: ampicillin, piperacillin    They return back today 3/9/18 to followup her renal ultrasound results and for a catheterized urine to be obtained by me to send for a urine culture.     Review of records from [facility] and Dr. Andino's office-   Pt was recommended estrace cream.   UTI treated with cipro and levaquin in 6/2017.   Saw Dr. Andino 3/6/17for UTIs   3/7/17 - mixed microbial population, no predominating organisms; probable contaminants.   3/8/17 - insufficient growth   Pelvic floor physical therapy 3/16/17     Renal ultrasound (report only) - 3/9/17 - right kidney 9 x 4.8 x 4.8cm. No hydro or stones noted.   Left kidney - 9.4 x 4.1 x 5.4cm, subjective cortical atrophy. No hydro or stones noted.   Serum Cr 0.85 6/8/17     Urine culture 7/6/17 - klebsiella >100K sensitive to all except bactrim.     I personally reviewed the images: renal ultrasound 3/8/18 -The right kidney measures 8.8 cm in length.  There is no hydronephrosis.  The resistive index within a parenchymal artery is 0.6.    The left kidney measures 9.8 cm in length.  There is no  "hydronephrosis. The resistive index within a parenchymal artery is 0.7.  The bladder is unremarkable. The post void residual is 22 mL.    12/7/18-  She saw Dr. Portillo, starting 11/5/18 she was maintained on a prophylactic antibiotic dose of macrobid 50mg daily. On 11/19/18 per Dr. Portillo's note "Will increase macrobid to 100 mg bid x 7 days. Will increase macrobid prophylaxis to 100 mg nightly"  Because of klebsiella UTI >100k on 11/16/18  Her hydration patterns have not changed since we last met. She drinks 2 cups of coffee daily. Drinks very little water.   Bowel movements - has a bowel movement every day. No straining or constipation.   Bmp from outside lab that her daughter was able to pull up 9/2018- serum creatinine 0.86  Both the patient and her daughter voiced frustration with not getting any resolution to her UTI's.     1/4/2019  At the visit on 12/7/18 I catheterized her and it demonstrated a klebsiella UTI for which she completed ceftin x 7 days. She was then taking trimethoprim daily for prophylaxis.   She added herself on to clinic today urgently. She was doing well until about 2-3 days ago she began to experience a recurrence of dysuria and urinary frequency. She also suffered a fall last week and has been experiencing back pain. Her daughter recalls that the patient was complaining of back pain before her fall. She patient came in today and experienced a strong urge to urinate and I catheterized her at the beginning of the visit.  Her daughter was trying to recall an anti-bacterial Dr. Plascencia prescribed that seemed to help with the patient's back pain? She will continue to do research and look into this medication and will message me back on the portal.     ---  Past Medical History:   Diagnosis Date    Allergy     C. difficile colitis     Frequent UTI     GERD (gastroesophageal reflux disease)     Hiatal hernia     Hypertension        Past Surgical History:   Procedure Laterality Date    " APPENDECTOMY         Family History   Problem Relation Age of Onset    Colon cancer Maternal Aunt     Prostate cancer Neg Hx     Kidney disease Neg Hx     Breast cancer Neg Hx     Ovarian cancer Neg Hx        Social History     Tobacco Use    Smoking status: Former Smoker    Smokeless tobacco: Never Used   Substance Use Topics    Alcohol use: No    Drug use: No       Current Outpatient Medications on File Prior to Visit   Medication Sig Dispense Refill    amoxicillin (AMOXIL) 250 MG capsule Take 250 mg by mouth 3 (three) times daily.      estradiol (ESTRACE) 0.01 % (0.1 mg/gram) vaginal cream Rub pea-sized amount near urethral orifice every night x 2 weeks then three times weekly. 42.5 g 0    Lactobac no.41/Bifidobact no.7 (PROBIOTIC-10 ORAL) Take by mouth.      multivitamin capsule Take 1 capsule by mouth once daily.      nitrofurantoin (MACRODANTIN) 50 MG capsule Take 1 capsule (50 mg total) by mouth every evening. (Patient taking differently: Take 100 mg by mouth every evening. ) 30 capsule 3    nitrofurantoin, macrocrystal-monohydrate, (MACROBID) 100 MG capsule Take 1 capsule (100 mg total) by mouth 2 (two) times daily. 14 capsule 0    nitrofurantoin, macrocrystal-monohydrate, (MACROBID) 100 MG capsule Take 1 capsule (100 mg total) by mouth nightly. 30 capsule 3    omega-3 acid ethyl esters (LOVAZA) 1 gram capsule Take 2 g by mouth 2 (two) times daily.      THEANINE ORAL Take by mouth.      trimethoprim (TRIMPEX) 100 mg Tab Take 1 tablet (100 mg total) by mouth once daily. 30 tablet 2     No current facility-administered medications on file prior to visit.        Review of patient's allergies indicates:   Allergen Reactions    Norvasc [amlodipine] Edema    Premarin [conjugated estrogens] Other (See Comments)     Causes Dizziness    Bactrim [sulfamethoxazole-trimethoprim]      Made pt feel weak     Lisinopril Other (See Comments)     cough       Review of Systems   Constitutional: Negative  for activity change.   HENT: Negative for congestion.    Eyes: Negative for visual disturbance.   Respiratory: Negative for shortness of breath.    Cardiovascular: Negative for chest pain.   Gastrointestinal: Negative for abdominal distention.   Musculoskeletal: Negative for gait problem.   Skin: Negative for color change.   Neurological: Negative for dizziness.   Psychiatric/Behavioral: Negative for agitation.       Objective:      Physical Exam   Constitutional: She is oriented to person, place, and time. She appears well-developed.   HENT:   Head: Normocephalic and atraumatic.   Eyes: EOM are normal.   Neck: Normal range of motion.   Cardiovascular: Intact distal pulses.   Pulmonary/Chest: Effort normal.   Abdominal: Soft. She exhibits no distension. There is no tenderness.   Genitourinary:   Genitourinary Comments: Urethral meatus prepped with betadine and catheterized urine was collected, will be sent for culture and urinalysis   Musculoskeletal: Normal range of motion.   Neurological: She is alert and oriented to person, place, and time.   Skin: Skin is warm and dry.   Psychiatric: She has a normal mood and affect.       Assessment:       1. Recurrent UTI    2. Dysuria    3. Urinary urgency    4. Urine frequency        Plan:       1. Catheterized urine for Ua and culture sent today.   2. Will call in ceftin for 7 days for UTI treatment as it worked for her in December 2018.  3. Patient will stop daily bactrim while she is taking ceftin.   4. 8 glasses of 16 oz of water. She is not reaching this goal. I continue to repeat this recommendation as well as the recommendation to obtain a cup with markings so she can monitor and hopefully reach her goal.  5. Discussed seeing Dr. Hauser. The patient would like to hold off for now, if UTI's continue to occur despite daily bactrim, patient may reconsider.   6. Her daughter was trying to recall an anti-bacterial Dr. Plascencia prescribed that seemed to help with the  patient's back pain? She will continue to do research and look into this medication and will message me back on the portal.      Recurrent UTI  -     POCT URINE DIPSTICK WITHOUT MICROSCOPE    Dysuria  -     Urinalysis  -     Urinalysis Microscopic  -     Urine culture  -     POCT URINE DIPSTICK WITHOUT MICROSCOPE    Urinary urgency    Urine frequency    Other orders  -     cefUROXime (CEFTIN) 500 MG tablet; Take 1 tablet (500 mg total) by mouth every 12 (twelve) hours. for 7 days  Dispense: 14 tablet; Refill: 0

## 2019-01-06 LAB — BACTERIA UR CULT: NO GROWTH

## 2019-03-11 RX ORDER — TRIMETHOPRIM 100 MG/1
TABLET ORAL
Qty: 30 TABLET | Refills: 2 | Status: SHIPPED | OUTPATIENT
Start: 2019-03-11 | End: 2019-09-13 | Stop reason: SDUPTHER

## 2019-04-30 ENCOUNTER — TELEPHONE (OUTPATIENT)
Dept: UROLOGY | Facility: CLINIC | Age: 84
End: 2019-04-30

## 2019-04-30 NOTE — TELEPHONE ENCOUNTER
----- Message from Yakelin Vargas sent at 4/30/2019 12:00 PM CDT -----  Contact: [phone]/Sofia (pts daughter)  Sofia would like patient to be seen this week for an UTI. Please call and advise.

## 2019-05-03 ENCOUNTER — OFFICE VISIT (OUTPATIENT)
Dept: UROLOGY | Facility: CLINIC | Age: 84
End: 2019-05-03
Payer: MEDICARE

## 2019-05-03 ENCOUNTER — LAB VISIT (OUTPATIENT)
Dept: LAB | Facility: HOSPITAL | Age: 84
End: 2019-05-03
Attending: STUDENT IN AN ORGANIZED HEALTH CARE EDUCATION/TRAINING PROGRAM
Payer: MEDICARE

## 2019-05-03 VITALS
HEART RATE: 105 BPM | SYSTOLIC BLOOD PRESSURE: 170 MMHG | WEIGHT: 149 LBS | TEMPERATURE: 98 F | DIASTOLIC BLOOD PRESSURE: 92 MMHG | HEIGHT: 63 IN | BODY MASS INDEX: 26.4 KG/M2

## 2019-05-03 DIAGNOSIS — N39.0 URINARY TRACT INFECTION WITHOUT HEMATURIA, SITE UNSPECIFIED: Primary | ICD-10-CM

## 2019-05-03 DIAGNOSIS — N39.0 URINARY TRACT INFECTION WITHOUT HEMATURIA, SITE UNSPECIFIED: ICD-10-CM

## 2019-05-03 DIAGNOSIS — R35.1 NOCTURIA: ICD-10-CM

## 2019-05-03 LAB
BILIRUB UR QL STRIP: NEGATIVE
CLARITY UR REFRACT.AUTO: CLEAR
COLOR UR AUTO: YELLOW
ERYTHROCYTE [DISTWIDTH] IN BLOOD BY AUTOMATED COUNT: 14.6 % (ref 11.5–14.5)
GLUCOSE UR QL STRIP: NEGATIVE
HCT VFR BLD AUTO: 43.4 % (ref 37–48.5)
HGB BLD-MCNC: 14 G/DL (ref 12–16)
HGB UR QL STRIP: ABNORMAL
KETONES UR QL STRIP: NEGATIVE
LEUKOCYTE ESTERASE UR QL STRIP: NEGATIVE
MCH RBC QN AUTO: 28.1 PG (ref 27–31)
MCHC RBC AUTO-ENTMCNC: 32.3 G/DL (ref 32–36)
MCV RBC AUTO: 87 FL (ref 82–98)
MICROSCOPIC COMMENT: ABNORMAL
NITRITE UR QL STRIP: NEGATIVE
PH UR STRIP: 6 [PH] (ref 5–8)
PLATELET # BLD AUTO: 232 K/UL (ref 150–350)
PMV BLD AUTO: 11.3 FL (ref 9.2–12.9)
PROT UR QL STRIP: NEGATIVE
RBC # BLD AUTO: 4.99 M/UL (ref 4–5.4)
RBC #/AREA URNS AUTO: 6 /HPF (ref 0–4)
SP GR UR STRIP: 1 (ref 1–1.03)
URN SPEC COLLECT METH UR: ABNORMAL
WBC # BLD AUTO: 10.64 K/UL (ref 3.9–12.7)
WBC #/AREA URNS AUTO: 0 /HPF (ref 0–5)

## 2019-05-03 PROCEDURE — 85027 COMPLETE CBC AUTOMATED: CPT

## 2019-05-03 PROCEDURE — 99214 OFFICE O/P EST MOD 30 MIN: CPT | Mod: PBBFAC,PO | Performed by: STUDENT IN AN ORGANIZED HEALTH CARE EDUCATION/TRAINING PROGRAM

## 2019-05-03 PROCEDURE — 51701 INSERT BLADDER CATHETER: CPT | Mod: PBBFAC,PO | Performed by: STUDENT IN AN ORGANIZED HEALTH CARE EDUCATION/TRAINING PROGRAM

## 2019-05-03 PROCEDURE — 99214 PR OFFICE/OUTPT VISIT, EST, LEVL IV, 30-39 MIN: ICD-10-PCS | Mod: S$PBB,25,, | Performed by: STUDENT IN AN ORGANIZED HEALTH CARE EDUCATION/TRAINING PROGRAM

## 2019-05-03 PROCEDURE — 87086 URINE CULTURE/COLONY COUNT: CPT

## 2019-05-03 PROCEDURE — 81001 URINALYSIS AUTO W/SCOPE: CPT

## 2019-05-03 PROCEDURE — 51701 PR INSERTION OF NON-INDWELLING BLADDER CATHETERIZATION FOR RESIDUAL UR: ICD-10-PCS | Mod: S$PBB,,, | Performed by: STUDENT IN AN ORGANIZED HEALTH CARE EDUCATION/TRAINING PROGRAM

## 2019-05-03 PROCEDURE — 87088 URINE BACTERIA CULTURE: CPT

## 2019-05-03 PROCEDURE — 99999 PR PBB SHADOW E&M-EST. PATIENT-LVL IV: CPT | Mod: PBBFAC,,, | Performed by: STUDENT IN AN ORGANIZED HEALTH CARE EDUCATION/TRAINING PROGRAM

## 2019-05-03 PROCEDURE — 36415 COLL VENOUS BLD VENIPUNCTURE: CPT

## 2019-05-03 PROCEDURE — 51701 INSERT BLADDER CATHETER: CPT | Mod: S$PBB,,, | Performed by: STUDENT IN AN ORGANIZED HEALTH CARE EDUCATION/TRAINING PROGRAM

## 2019-05-03 PROCEDURE — 99214 OFFICE O/P EST MOD 30 MIN: CPT | Mod: S$PBB,25,, | Performed by: STUDENT IN AN ORGANIZED HEALTH CARE EDUCATION/TRAINING PROGRAM

## 2019-05-03 PROCEDURE — 99999 PR PBB SHADOW E&M-EST. PATIENT-LVL IV: ICD-10-PCS | Mod: PBBFAC,,, | Performed by: STUDENT IN AN ORGANIZED HEALTH CARE EDUCATION/TRAINING PROGRAM

## 2019-05-03 RX ORDER — ACETAMINOPHEN 500 MG
TABLET ORAL
COMMUNITY
Start: 2019-01-01

## 2019-05-03 RX ORDER — TRIAMCINOLONE ACETONIDE 55 UG/1
SPRAY, METERED NASAL
COMMUNITY
Start: 2018-01-01

## 2019-05-03 RX ORDER — ASCORBIC ACID 500 MG
TABLET ORAL
COMMUNITY
Start: 1990-01-01

## 2019-05-03 RX ORDER — MINERAL OIL
ENEMA (ML) RECTAL
COMMUNITY
Start: 2015-01-01

## 2019-05-03 RX ORDER — ACETAMINOPHEN, DIPHENHYDRAMINE HCL, PHENYLEPHRINE HCL 325; 25; 5 MG/1; MG/1; MG/1
TABLET ORAL
COMMUNITY
Start: 2015-01-01

## 2019-05-03 NOTE — PROGRESS NOTES
"Subjective:       Patient ID: Tristin Weeks is a 86 y.o. female.    Chief Complaint: Urinary Tract Infection  This is a 86 y.o.  female patient that is an established patient of mine.    She was originally referred to me by Dr. Anastasiya Escobedo for recurrent UTIs.  She is here today with her daughter Ms. Black (we had her sign permission today so if we are unable to reach the patient or if she doesn't understand, we have her permission to contact her daughter). She reports a history of urinary tract infections, approximately 2 per year. She states that her UTI symptoms- she "feels awful", frequency, denies dysuria, denies gross hematuria, her daughter describes that she is more combative and agitated when she may have a UTI. The patient reports that when she "begins to feel bad" she lets it "go on for awhile until it gets really bad and I want to die." She has a difficult time describing what symptoms escalate to the point of that severe level of bother. She believes when it is that severe she has a headache, difficulty walking, and feels like her legs are weak. Her daughter fills in the gaps and informed me that sometimes her mom becomes very agitated, combative, and has short term memory loss without recollection of their interactions or conversations.   She has been evaluated by Dr. Andino at [facility]. She was prescribed estrogen cream but stopped it because she "didn't like it." She feels like she recalls that it caused some local itchiness. She was also referred to pelvic floor therapy and feels like this therapy did help. She was told she has a cystocele but was not recommended any intervention for this and was told it was mild per the patient.   She drinks a cup of coffee every morning, then some water possibly 2 cups of water during the day.   US about 1 year ago. Her daughter feels like she recalls no other finding other than the kidneys were "smaller"  She moved to [place] from [place]. She lives in [facility] [place] for 6 months and in [place] for the other 6 months.   She is here today with her daughter - Sofia.   Urine culture history from outside reports (SCANNED INTO MEDIA at initial visit)  3/7/17 - mixed microbial population, no predominating organisms, probable contaminants  The next 4 urine culture results were obtained from a [facility] printout by the patient's daughter - no sensitivity results available  9/27/17 - <10K CFU multiple organisms, probably contaminants  9/27/17 - 50-100K CFU klebsiella oxytoca  11/2/17 - no growth  12/18/17 - >100K Raoultella ornithinolytica  (per outside ID consultant, this is a gram negative organism that used to be classified as klebsiella)  [facility] result:  2/20/18 - Raoultella planticola >100K - sensitive to: amox/clav, cefepime, ceftriaxone, cefuroxime, cephalothin, cipro, gent, imipenem, nitrofurantoin, tetracycline, tobramycin, bactrim; resistant to: ampicillin, piperacillin    They return back today 3/9/18 to followup her renal ultrasound results and for a catheterized urine to be obtained by me to send for a urine culture.     Review of records from [facility] and Dr. Andino's office-   Pt was recommended estrace cream.   UTI treated with cipro and levaquin in 6/2017.   Saw Dr. Andino 3/6/17for UTIs   3/7/17 - mixed microbial population, no predominating organisms; probable contaminants.   3/8/17 - insufficient growth   Pelvic floor physical therapy 3/16/17     Renal ultrasound (report only) - 3/9/17 - right kidney 9 x 4.8 x 4.8cm. No hydro or stones noted.   Left kidney - 9.4 x 4.1 x 5.4cm, subjective cortical atrophy. No hydro or stones noted.   Serum Cr 0.85 6/8/17     Urine culture 7/6/17 - klebsiella >100K sensitive to all except bactrim.     I personally reviewed the images: renal ultrasound 3/8/18 -The right kidney measures 8.8 cm in length.  There is no hydronephrosis.  The resistive index within a parenchymal artery is 0.6.    The left kidney measures 9.8 cm in length.  " There is no hydronephrosis. The resistive index within a parenchymal artery is 0.7.  The bladder is unremarkable. The post void residual is 22 mL.    12/7/18-  She saw Dr. Portillo, starting 11/5/18 she was maintained on a prophylactic antibiotic dose of macrobid 50mg daily. On 11/19/18 per Dr. Portillo's note "Will increase macrobid to 100 mg bid x 7 days. Will increase macrobid prophylaxis to 100 mg nightly"  Because of klebsiella UTI >100k on 11/16/18  Her hydration patterns have not changed since we last met. She drinks 2 cups of coffee daily. Drinks very little water.   Bowel movements - has a bowel movement every day. No straining or constipation.   Bmp from outside lab that her daughter was able to pull up 9/2018- serum creatinine 0.86  Both the patient and her daughter voiced frustration with not getting any resolution to her UTI's.     1/4/2019  At the visit on 12/7/18 I catheterized her and it demonstrated a klebsiella UTI for which she completed ceftin x 7 days. She was then taking trimethoprim daily for prophylaxis.   She added herself on to clinic today urgently. She was doing well until about 2-3 days ago she began to experience a recurrence of dysuria and urinary frequency. She also suffered a fall last week and has been experiencing back pain. Her daughter recalls that the patient was complaining of back pain before her fall. She patient came in today and experienced a strong urge to urinate and I catheterized her at the beginning of the visit.  Her daughter was trying to recall an anti-bacterial Dr. Plascencia prescribed that seemed to help with the patient's back pain? She will continue to do research and look into this medication and will message me back on the portal.     5/3/19  The patient returns back today after her daughter Sofia called our office. The patient was worried that she may be developing another UTI. The patient's UTI symptoms that she is experiencing are - increased nocturia (from " 2x/night to 3x/night). She is getting ready for another trip to [place], possibly getting preoccupied about the trip. She is very sensitive to antibiotics. Has allergies listed, she did not tolerate Augmentin in the past, etc.   Hydration - 1-2 cups, 1 bottle of Dewey water, possible another glass later in the day. She has tried to improve her hydration patterns since our last visit.  She has been taking the Hiprex 1g orally once daily and the trimethoprim 100mg once daily.     No results found for: CREATININE     ---  Past Medical History:   Diagnosis Date    Allergy     C. difficile colitis     Frequent UTI     GERD (gastroesophageal reflux disease)     Hiatal hernia     Hypertension        Past Surgical History:   Procedure Laterality Date    APPENDECTOMY         Family History   Problem Relation Age of Onset    Colon cancer Maternal Aunt     Prostate cancer Neg Hx     Kidney disease Neg Hx     Breast cancer Neg Hx     Ovarian cancer Neg Hx        Social History     Tobacco Use    Smoking status: Former Smoker    Smokeless tobacco: Never Used   Substance Use Topics    Alcohol use: No    Drug use: No       Current Outpatient Medications on File Prior to Visit   Medication Sig Dispense Refill    ALFALFA ORAL       ascorbic acid, vitamin C, (VITAMIN C) 500 MG tablet       cholecalciferol, vitamin D3, (VITAMIN D3) 2,000 unit Cap       cyanocobalamin, vitamin B-12, (VITAMIN B-12) 5,000 mcg Subl       estradiol (ESTRACE) 0.01 % (0.1 mg/gram) vaginal cream Rub pea-sized amount near urethral orifice every night x 2 weeks then three times weekly. 42.5 g 0    fexofenadine (ALLEGRA ALLERGY) 180 MG tablet       Lactobac no.41/Bifidobact no.7 (PROBIOTIC-10 ORAL) Take by mouth.      MAGNESIUM GLYCINATE ORAL       methenamine (HIPREX) 1 gram Tab Take 1 tablet (1 g total) by mouth 2 (two) times daily. 180 tablet 3    multivit-min-iron-vitamin K 18 mg iron-25 mcg Tab       omega 3-dha-epa-other  om3-D3 2,200 mg-1,000 unit/5 mL Liqd       ranitidine (ZANTAC) 150 MG tablet       THEANINE ORAL       triamcinolone (NASACORT) 55 mcg nasal inhaler       trimethoprim (TRIMPEX) 100 mg Tab TAKE 1 TABLET BY MOUTH ONCE DAILY 30 tablet 2    [DISCONTINUED] amoxicillin (AMOXIL) 250 MG capsule Take 250 mg by mouth 3 (three) times daily.      [DISCONTINUED] multivitamin capsule Take 1 capsule by mouth once daily.      [DISCONTINUED] omega-3 acid ethyl esters (LOVAZA) 1 gram capsule Take 2 g by mouth 2 (two) times daily.      [DISCONTINUED] THEANINE ORAL Take by mouth.       No current facility-administered medications on file prior to visit.        Review of patient's allergies indicates:   Allergen Reactions    Norvasc [amlodipine] Edema    Premarin [conjugated estrogens] Other (See Comments)     Causes Dizziness    Bactrim [sulfamethoxazole-trimethoprim]      Made pt feel weak     Lisinopril Other (See Comments)     cough       Review of Systems   Constitutional: Negative for activity change.   HENT: Negative for congestion.    Eyes: Negative for visual disturbance.   Respiratory: Negative for shortness of breath.    Cardiovascular: Negative for chest pain.   Gastrointestinal: Negative for abdominal distention.   Musculoskeletal: Negative for gait problem.   Skin: Negative for color change.   Neurological: Negative for dizziness.   Psychiatric/Behavioral: Negative for agitation.       Objective:      Physical Exam   Constitutional: She is oriented to person, place, and time. She appears well-developed.   HENT:   Head: Normocephalic and atraumatic.   Eyes: EOM are normal.   Neck: Normal range of motion.   Cardiovascular: Intact distal pulses.   Pulmonary/Chest: Effort normal.   Abdominal: Soft. She exhibits no distension. There is no tenderness.   Genitourinary:   Genitourinary Comments: Urethral meatus normal appearing, prepped with betadyne, in and out catheter performed for urinalysis and culture, light  yellow urine collected   Musculoskeletal: Normal range of motion.   Neurological: She is alert and oriented to person, place, and time.   Skin: Skin is warm and dry.   Psychiatric: She has a normal mood and affect.       Assessment:       1. Urinary tract infection without hematuria, site unspecified    2. Nocturia        Plan:       1. In and out catheterization performed for urine sample to be sent for UA and culture to rule out UTI. The patient and her daughter request no antibiotics be sent for now as she is not experiencing her previous classic sx of a UTI  (change in urinary patterns, cognition, combativeness, etc). Will send portal message when urine culture results.  2. Patient's daughter requested a CBC to be ordered for rheumatoid concerns. Can CC Dr. Aguiar with results.    Urinary tract infection without hematuria, site unspecified  -     Urinalysis  -     Urinalysis Microscopic  -     Urine culture  -     CBC Without Differential; Future; Expected date: 05/03/2019    Nocturia       No

## 2019-05-05 LAB — BACTERIA UR CULT: NORMAL

## 2019-05-06 ENCOUNTER — PATIENT MESSAGE (OUTPATIENT)
Dept: UROLOGY | Facility: CLINIC | Age: 84
End: 2019-05-06

## 2019-05-10 RX ORDER — TRIMETHOPRIM 100 MG/1
TABLET ORAL
Qty: 30 TABLET | Refills: 2 | Status: SHIPPED | OUTPATIENT
Start: 2019-05-10 | End: 2019-09-06 | Stop reason: SDUPTHER

## 2019-09-12 ENCOUNTER — PATIENT MESSAGE (OUTPATIENT)
Dept: UROLOGY | Facility: CLINIC | Age: 84
End: 2019-09-12

## 2019-09-13 ENCOUNTER — TELEPHONE (OUTPATIENT)
Dept: UROLOGY | Facility: CLINIC | Age: 84
End: 2019-09-13

## 2019-09-13 RX ORDER — TRIMETHOPRIM 100 MG/1
TABLET ORAL
Qty: 30 TABLET | Refills: 3 | OUTPATIENT
Start: 2019-09-13

## 2019-09-13 RX ORDER — TRIMETHOPRIM 100 MG/1
100 TABLET ORAL DAILY
Qty: 30 TABLET | Refills: 2 | Status: SHIPPED | OUTPATIENT
Start: 2019-09-13 | End: 2019-10-23

## 2019-09-13 NOTE — TELEPHONE ENCOUNTER
----- Message from Winnie Sarabia sent at 9/13/2019  3:09 PM CDT -----  Contact: Self [phone]  Please approve Rx refill on Dr Farrar's behalf.   ----- Message -----  From: Daisy Leung  Sent: 9/13/2019   1:59 PM  To: Charan Nolen Staff    Patient is calling to get refills on her medication sent to [facility] [address] [place] [address]. STREET [phone] (Phone)  [phone] (Fax)      1. trimethoprim (TRIMPEX) 100 mg Tab 30 tablet

## 2019-09-13 NOTE — TELEPHONE ENCOUNTER
----- Message from Winnie Sarabia sent at 9/13/2019  3:34 PM CDT -----  Contact: Self [phone]      ----- Message -----  From: Winnie Sarabia  Sent: 9/13/2019   3:09 PM  To: Anthony Lambert MD    Please approve Rx refill on Dr Farrar's behalf.   ----- Message -----  From: Daisy Leung  Sent: 9/13/2019   1:59 PM  To: Charan Nolen Staff    Patient is calling to get refills on her medication sent to [facility] [address] [place] [address]. STREET [phone] (Phone)  [phone] (Fax)      1. trimethoprim (TRIMPEX) 100 mg Tab 30 tablet

## 2019-09-13 NOTE — TELEPHONE ENCOUNTER
----- Message from Winnie Sarabia sent at 9/13/2019  3:34 PM CDT -----  Contact: Self [phone]      ----- Message -----  From: Winnie Sarabia  Sent: 9/13/2019   3:09 PM  To: Anthony Lambert MD    Please approve Rx refill on Dr Farrar's behalf.   ----- Message -----  From: Daisy Leung  Sent: 9/13/2019   1:59 PM  To: Charan Nolen Staff    Patient is calling to get refills on her medication sent to [facility] [address] [place] [address]. STREET [phone] (Phone)  [phone] (Fax)      1. trimethoprim (TRIMPEX) 100 mg Tab 30 tablet

## 2019-09-13 NOTE — TELEPHONE ENCOUNTER
Spoke with patient's daughter, informed trimpex has been denied due to allergy to sulfa listed in allergy card.    Informed patient, while she is known to Dr Farrar for treatment for prevention of UTI's, another Urologist may not feel comfortable prescribing Trimpex for patient.  Offered an appointment with a Urologist or her primary MD, daughter informed me she does not think patient needs an appointment for this.  Apologized for any inconveniences, Daughter informed me she understood.  She will reach out to GYN MD.    Upon reviewing chart, Dr Lambert has approved the trimpex.  Daughter has been informed.       This is a known patient to Dr Frarar that has been experiencing recurrent UTI's and has finally come up with a prescription to help control the recurrent symptoms.  I spoke with patient's daughter, who is very involved in her mother's care and stated her mother is really not allergic to trimpex, but experiences an intolerance.  Allergy card updated.

## 2019-10-10 RX ORDER — TRIMETHOPRIM 100 MG/1
TABLET ORAL
Qty: 30 TABLET | Refills: 2 | Status: SHIPPED | OUTPATIENT
Start: 2019-10-10 | End: 2019-11-07 | Stop reason: SDUPTHER

## 2019-10-23 ENCOUNTER — CLINICAL SUPPORT (OUTPATIENT)
Dept: LAB | Facility: HOSPITAL | Age: 84
End: 2019-10-23
Attending: FAMILY MEDICINE
Payer: MEDICARE

## 2019-10-23 ENCOUNTER — OFFICE VISIT (OUTPATIENT)
Dept: FAMILY MEDICINE | Facility: CLINIC | Age: 84
End: 2019-10-23
Payer: MEDICARE

## 2019-10-23 VITALS
OXYGEN SATURATION: 96 % | HEART RATE: 91 BPM | SYSTOLIC BLOOD PRESSURE: 138 MMHG | HEIGHT: 63 IN | BODY MASS INDEX: 26.45 KG/M2 | DIASTOLIC BLOOD PRESSURE: 89 MMHG | WEIGHT: 149.25 LBS

## 2019-10-23 DIAGNOSIS — Z01.818 PREOPERATIVE EXAMINATION: Primary | ICD-10-CM

## 2019-10-23 DIAGNOSIS — M85.80 OSTEOPENIA, UNSPECIFIED LOCATION: ICD-10-CM

## 2019-10-23 DIAGNOSIS — N39.0 RECURRENT UTI: ICD-10-CM

## 2019-10-23 DIAGNOSIS — Z01.818 PREOPERATIVE EXAMINATION: ICD-10-CM

## 2019-10-23 LAB
ALBUMIN SERPL BCP-MCNC: 3.8 G/DL (ref 3.5–5.2)
ALP SERPL-CCNC: 83 U/L (ref 55–135)
ALT SERPL W/O P-5'-P-CCNC: 20 U/L (ref 10–44)
ANION GAP SERPL CALC-SCNC: 11 MMOL/L (ref 8–16)
AST SERPL-CCNC: 23 U/L (ref 10–40)
BASOPHILS # BLD AUTO: 0.05 K/UL (ref 0–0.2)
BASOPHILS NFR BLD: 0.5 % (ref 0–1.9)
BILIRUB SERPL-MCNC: 0.5 MG/DL (ref 0.1–1)
BUN SERPL-MCNC: 17 MG/DL (ref 8–23)
CALCIUM SERPL-MCNC: 10 MG/DL (ref 8.7–10.5)
CHLORIDE SERPL-SCNC: 104 MMOL/L (ref 95–110)
CO2 SERPL-SCNC: 24 MMOL/L (ref 23–29)
CREAT SERPL-MCNC: 1 MG/DL (ref 0.5–1.4)
DIFFERENTIAL METHOD: NORMAL
EOSINOPHIL # BLD AUTO: 0.2 K/UL (ref 0–0.5)
EOSINOPHIL NFR BLD: 1.7 % (ref 0–8)
ERYTHROCYTE [DISTWIDTH] IN BLOOD BY AUTOMATED COUNT: 14.3 % (ref 11.5–14.5)
EST. GFR  (AFRICAN AMERICAN): 59 ML/MIN/1.73 M^2
EST. GFR  (NON AFRICAN AMERICAN): 51 ML/MIN/1.73 M^2
GLUCOSE SERPL-MCNC: 88 MG/DL (ref 70–110)
HCT VFR BLD AUTO: 44.1 % (ref 37–48.5)
HGB BLD-MCNC: 14.3 G/DL (ref 12–16)
LYMPHOCYTES # BLD AUTO: 2.3 K/UL (ref 1–4.8)
LYMPHOCYTES NFR BLD: 21.6 % (ref 18–48)
MCH RBC QN AUTO: 28.5 PG (ref 27–31)
MCHC RBC AUTO-ENTMCNC: 32.4 G/DL (ref 32–36)
MCV RBC AUTO: 88 FL (ref 82–98)
MONOCYTES # BLD AUTO: 0.8 K/UL (ref 0.3–1)
MONOCYTES NFR BLD: 6.9 % (ref 4–15)
NEUTROPHILS # BLD AUTO: 7.5 K/UL (ref 1.8–7.7)
NEUTROPHILS NFR BLD: 69.3 % (ref 38–73)
PLATELET # BLD AUTO: 264 K/UL (ref 150–350)
PMV BLD AUTO: 11.9 FL (ref 9.2–12.9)
POTASSIUM SERPL-SCNC: 4.3 MMOL/L (ref 3.5–5.1)
PROT SERPL-MCNC: 7.4 G/DL (ref 6–8.4)
RBC # BLD AUTO: 5.02 M/UL (ref 4–5.4)
SODIUM SERPL-SCNC: 139 MMOL/L (ref 136–145)
WBC # BLD AUTO: 10.85 K/UL (ref 3.9–12.7)

## 2019-10-23 PROCEDURE — 93010 EKG 12-LEAD: ICD-10-PCS | Mod: ,,, | Performed by: INTERNAL MEDICINE

## 2019-10-23 PROCEDURE — 99999 PR PBB SHADOW E&M-EST. PATIENT-LVL IV: CPT | Mod: PBBFAC,,, | Performed by: FAMILY MEDICINE

## 2019-10-23 PROCEDURE — 99214 OFFICE O/P EST MOD 30 MIN: CPT | Mod: PBBFAC,25,PO | Performed by: FAMILY MEDICINE

## 2019-10-23 PROCEDURE — 93010 ELECTROCARDIOGRAM REPORT: CPT | Mod: ,,, | Performed by: INTERNAL MEDICINE

## 2019-10-23 PROCEDURE — 99214 OFFICE O/P EST MOD 30 MIN: CPT | Mod: S$PBB,,, | Performed by: FAMILY MEDICINE

## 2019-10-23 PROCEDURE — 85025 COMPLETE CBC W/AUTO DIFF WBC: CPT

## 2019-10-23 PROCEDURE — 36415 COLL VENOUS BLD VENIPUNCTURE: CPT

## 2019-10-23 PROCEDURE — 93005 ELECTROCARDIOGRAM TRACING: CPT

## 2019-10-23 PROCEDURE — 99214 PR OFFICE/OUTPT VISIT, EST, LEVL IV, 30-39 MIN: ICD-10-PCS | Mod: S$PBB,,, | Performed by: FAMILY MEDICINE

## 2019-10-23 PROCEDURE — 99999 PR PBB SHADOW E&M-EST. PATIENT-LVL IV: ICD-10-PCS | Mod: PBBFAC,,, | Performed by: FAMILY MEDICINE

## 2019-10-23 PROCEDURE — 80053 COMPREHEN METABOLIC PANEL: CPT

## 2019-10-23 NOTE — PROGRESS NOTES
Subjective:       Patient ID: Tristin Weeks is a 87 y.o. female.    Chief Complaint: Establish Care (would like to get regular flu instead of high dose.  Patient also requesting ear check. )    88 yo F pt, new to me, with PMH significant for HTN (on no antihypertensive meds); Recurrent UTI; GERD; Allergy; C diff colitis; and Osteopenia. She presents to establish care; accompanied in the office today by her daughter Sofia.      -Type of Surgery: Cataract Surgery with implant   - 10/28/19  -Surgery risk: Low risk surgery     -Functional capacity: > 4 METS      -Clinical risk factors:    -CAD: no history    -CHF: no history    -Cerebrovascular disease: no history    -Insulin-dependent DM: no history    -Creatinine > 2.0: no history     -Active cardiac conditions:    -MI within 30 days or current unstable / severe angina: no history.    -Decompensated CHF: no history.    -Significant arrhythmia (high grade AVB, new VT, SVT with HR >100, symptomatic bradycardia): no history.    -Severe aortic stenosis or symptomatic mitral stenosis: no history.     -History of prior cardiac workup: Yes. Has had 2 stress tests in the past which were normal (per pt and daughter)    -CORINA screen: (-) S (-) T (-) O (+) P      -Personal or Family History of Anesthesia Rxn: None     -Tobacco:Initiated at age 18-21 yo; Last use 1991; 1ppd   -EtOH: None  -Drugs: None    Current Meds:  Current Outpatient Medications on File Prior to Visit:  ALFALFA ORAL, , Disp: , Rfl:   ascorbic acid, vitamin C, (VITAMIN C) 500 MG tablet, , Disp: , Rfl:   cholecalciferol, vitamin D3, (VITAMIN D3) 2,000 unit Cap, , Disp: , Rfl:   cyanocobalamin, vitamin B-12, (VITAMIN B-12) 5,000 mcg Subl, , Disp: , Rfl:   estradiol (ESTRACE) 0.01 % (0.1 mg/gram) vaginal cream, Rub pea-sized amount near urethral orifice every night x 2 weeks then three times weekly., Disp: 42.5 g, Rfl: 0  fexofenadine (ALLEGRA ALLERGY) 180 MG tablet, , Disp: , Rfl:   Lactobac no.41/Bifidobact no.7  (PROBIOTIC-10 ORAL), Take by mouth., Disp: , Rfl:   MAGNESIUM GLYCINATE ORAL, , Disp: , Rfl:   methenamine (HIPREX) 1 gram Tab, Take 1 tablet (1 g total) by mouth 2 (two) times daily., Disp: 180 tablet, Rfl: 3  multivit-min-iron-vitamin K 18 mg iron-25 mcg Tab, , Disp: , Rfl:   omega 3-dha-epa-other om3-D3 2,200 mg-1,000 unit/5 mL Liqd, , Disp: , Rfl:   THEANINE ORAL, , Disp: , Rfl:   triamcinolone (NASACORT) 55 mcg nasal inhaler, , Disp: , Rfl:   trimethoprim (TRIMPEX) 100 mg Tab, TAKE 1 TABLET BY MOUTH ONCE DAILY, Disp: 30 tablet, Rfl: 2  (DISCONTINUED) ranitidine (ZANTAC) 150 MG tablet, , Disp: , Rfl:   (DISCONTINUED) trimethoprim (TRIMPEX) 100 mg Tab, Take 1 tablet (100 mg total) by mouth once daily., Disp: 30 tablet, Rfl: 2    No current facility-administered medications on file prior to visit.        Review of Systems   Constitutional: Negative for activity change and unexpected weight change.   HENT: Positive for hearing loss. Negative for rhinorrhea and trouble swallowing.    Eyes: Positive for visual disturbance. Negative for discharge.   Respiratory: Negative for chest tightness and wheezing.    Cardiovascular: Negative for chest pain and palpitations.   Gastrointestinal: Negative for blood in stool, constipation, diarrhea and vomiting.   Endocrine: Negative for polydipsia and polyuria.   Genitourinary: Negative for difficulty urinating, dysuria, hematuria and menstrual problem.   Musculoskeletal: Negative for arthralgias, joint swelling and neck pain.   Neurological: Negative for weakness and headaches.   Psychiatric/Behavioral: Negative for confusion and dysphoric mood.         Past Medical History:   Diagnosis Date    Allergy     C. difficile colitis     Frequent UTI     GERD (gastroesophageal reflux disease)     Hiatal hernia     Hypertension        Patient Active Problem List   Diagnosis    Osteopenia    Recurrent UTI       Past Surgical History:   Procedure Laterality Date    APPENDECTOMY    "      Family History   Problem Relation Age of Onset    Colon cancer Maternal Aunt     Prostate cancer Neg Hx     Kidney disease Neg Hx     Breast cancer Neg Hx     Ovarian cancer Neg Hx        Social History     Tobacco Use   Smoking Status Former Smoker   Smokeless Tobacco Never Used       Social History     Social History Narrative    Lives with daughter (Sofia)    Permanent home is [place]       Objective:        Vitals:    10/23/19 1116   BP: 138/89   Pulse: 91   SpO2: 96%   Weight: 67.7 kg (149 lb 4 oz)   Height: 5' 3" (1.6 m)       Physical Exam   Constitutional: She is oriented to person, place, and time. She appears well-developed and well-nourished. No distress.   HENT:   Head: Normocephalic and atraumatic.   Right Ear: Tympanic membrane, external ear and ear canal normal.   Left Ear: Tympanic membrane, external ear and ear canal normal.   Nose: Nose normal. No mucosal edema or rhinorrhea. Right sinus exhibits no maxillary sinus tenderness and no frontal sinus tenderness. Left sinus exhibits no maxillary sinus tenderness and no frontal sinus tenderness.   Mouth/Throat: Oropharynx is clear and moist and mucous membranes are normal. No oropharyngeal exudate.   Eyes: Conjunctivae and EOM are normal. No scleral icterus.   Neck: Normal range of motion. Neck supple. No thyromegaly present.   Cardiovascular: Normal rate, regular rhythm and normal heart sounds. Exam reveals no gallop and no friction rub.   No murmur heard.  Pulses:       Dorsalis pedis pulses are 2+ on the right side, and 2+ on the left side.   Pulmonary/Chest: Effort normal and breath sounds normal. She has no wheezes. She has no rales.   Abdominal: Soft. Bowel sounds are normal. She exhibits no distension and no mass. There is no tenderness.   Musculoskeletal: Normal range of motion. She exhibits no edema or deformity.   Lymphadenopathy:     She has no cervical adenopathy.   Neurological: She is alert and oriented to person, place, and " time. She has normal strength. No cranial nerve deficit or sensory deficit.   Reflex Scores:       Patellar reflexes are 2+ on the right side and 2+ on the left side.  Skin: Skin is warm and dry. No rash noted. No erythema.   Psychiatric: She has a normal mood and affect. Her behavior is normal.         Assessment:       1. Preoperative examination    2. Osteopenia, unspecified location    3. Recurrent UTI        Plan:       Tristin was seen today for establish care.    Diagnoses and all orders for this visit:    Preoperative examination  -     Comprehensive metabolic panel; Future  -     CBC auto differential; Future  -     SCHEDULED EKG 12-LEAD (to Muse); Future    Osteopenia, unspecified location    Recurrent UTI    Preoperative Exam   Planning for cataract surgery of right eye with implant on 10/28/2019 with Dr. Sam Troncoso  Low Risk Surgery   ASA 2 (h/o HTN--BP well-controlled without medications)   CBC and CMP wnl today 10/23/2019  EKG today 10/23/2019 shows NSR with PACs   Mckinney score 0.19%-no further cardiac work-up necessary  Medically optimized to undergo procedure  Form completed    Osteopenia   No DEXA on file  Continue MVI and Vitamin D3 1000 IU daily    Recurrent UTI   --Followed by urology Dr. Farrar  --Doing well with methenamine 1g BID + trimethoprim 100 mg daily    HM   Pt would like regular dose Flu vaccine today; reports experiencing dizziness with high dose Flu vaccine in the past. Regular Flu vaccine dose not available in office. Plans to obtain at pharmacy   Discuss pneumovax/tdap/shingrix on f/u     F/U postoperatively

## 2019-10-24 ENCOUNTER — PATIENT MESSAGE (OUTPATIENT)
Dept: FAMILY MEDICINE | Facility: CLINIC | Age: 84
End: 2019-10-24

## 2019-10-28 ENCOUNTER — TELEPHONE (OUTPATIENT)
Dept: FAMILY MEDICINE | Facility: CLINIC | Age: 84
End: 2019-10-28

## 2019-10-28 NOTE — TELEPHONE ENCOUNTER
----- Message from Carlos Rothman sent at 10/28/2019  9:50 AM CDT -----  Contact: [phone]/ Judy with Outpatient Surgery  Judy with Outpatient Surgery would like to speak with you in regards to patient needing clearance for surgery which is today and the patient is at the hospital. Please call.

## 2019-10-28 NOTE — TELEPHONE ENCOUNTER
Re-faxed patient clearance to both numbers provided.  Advised office to please contact me if there are any further complications with fax.

## 2019-11-07 ENCOUNTER — PATIENT MESSAGE (OUTPATIENT)
Dept: UROLOGY | Facility: CLINIC | Age: 84
End: 2019-11-07

## 2019-11-07 DIAGNOSIS — N39.0 RECURRENT UTI: ICD-10-CM

## 2019-11-07 RX ORDER — METHENAMINE HIPPURATE 1000 MG/1
1 TABLET ORAL 2 TIMES DAILY
Qty: 180 TABLET | Refills: 3 | Status: SHIPPED | OUTPATIENT
Start: 2019-11-07 | End: 2020-12-29

## 2019-11-07 RX ORDER — TRIMETHOPRIM 100 MG/1
100 TABLET ORAL DAILY
Qty: 30 TABLET | Refills: 2 | Status: SHIPPED | OUTPATIENT
Start: 2019-11-07 | End: 2019-12-19 | Stop reason: SDUPTHER

## 2019-11-07 NOTE — TELEPHONE ENCOUNTER
Spoke with Ms Weeks's daughter about her medication Rx refill approval, she voiced her understanding.

## 2019-12-10 ENCOUNTER — OFFICE VISIT (OUTPATIENT)
Dept: FAMILY MEDICINE | Facility: CLINIC | Age: 84
End: 2019-12-10
Payer: MEDICARE

## 2019-12-10 VITALS
HEIGHT: 63 IN | OXYGEN SATURATION: 97 % | HEART RATE: 100 BPM | DIASTOLIC BLOOD PRESSURE: 90 MMHG | SYSTOLIC BLOOD PRESSURE: 158 MMHG | WEIGHT: 151.88 LBS | BODY MASS INDEX: 26.91 KG/M2

## 2019-12-10 DIAGNOSIS — M85.80 OSTEOPENIA, UNSPECIFIED LOCATION: ICD-10-CM

## 2019-12-10 DIAGNOSIS — I10 ELEVATED BLOOD PRESSURE READING IN OFFICE WITH DIAGNOSIS OF HYPERTENSION: ICD-10-CM

## 2019-12-10 DIAGNOSIS — Z01.818 PREOPERATIVE EXAMINATION: Primary | ICD-10-CM

## 2019-12-10 DIAGNOSIS — N39.0 RECURRENT UTI: ICD-10-CM

## 2019-12-10 PROCEDURE — 1126F AMNT PAIN NOTED NONE PRSNT: CPT | Mod: ,,, | Performed by: FAMILY MEDICINE

## 2019-12-10 PROCEDURE — 99214 PR OFFICE/OUTPT VISIT, EST, LEVL IV, 30-39 MIN: ICD-10-PCS | Mod: S$PBB,,, | Performed by: FAMILY MEDICINE

## 2019-12-10 PROCEDURE — 1126F PR PAIN SEVERITY QUANTIFIED, NO PAIN PRESENT: ICD-10-PCS | Mod: ,,, | Performed by: FAMILY MEDICINE

## 2019-12-10 PROCEDURE — 1159F MED LIST DOCD IN RCRD: CPT | Mod: ,,, | Performed by: FAMILY MEDICINE

## 2019-12-10 PROCEDURE — 99215 OFFICE O/P EST HI 40 MIN: CPT | Mod: PBBFAC,PO | Performed by: FAMILY MEDICINE

## 2019-12-10 PROCEDURE — 1159F PR MEDICATION LIST DOCUMENTED IN MEDICAL RECORD: ICD-10-PCS | Mod: ,,, | Performed by: FAMILY MEDICINE

## 2019-12-10 PROCEDURE — 99999 PR PBB SHADOW E&M-EST. PATIENT-LVL V: ICD-10-PCS | Mod: PBBFAC,,, | Performed by: FAMILY MEDICINE

## 2019-12-10 PROCEDURE — 99999 PR PBB SHADOW E&M-EST. PATIENT-LVL V: CPT | Mod: PBBFAC,,, | Performed by: FAMILY MEDICINE

## 2019-12-10 PROCEDURE — 99214 OFFICE O/P EST MOD 30 MIN: CPT | Mod: S$PBB,,, | Performed by: FAMILY MEDICINE

## 2019-12-10 NOTE — PROGRESS NOTES
Subjective:       Patient ID: Tristin Weeks is a 87 y.o. female.    Chief Complaint: Pre-op Exam    86 yo F pt, newly established with me, with PMH significant for HTN (on no antihypertensive meds); Recurrent UTI; GERD; Allergy; C diff colitis; and Osteopenia. She presents for preoperative exam; accompanied in the office today by her daughter Sofia.      -Type of Surgery: Cataract Surgery with implant of left eye   -Date of Surgery: 12/16/2019  -Surgery risk: Low risk surgery  -Surgeon: Dr. Sam Troncoso     -Functional capacity: > 4 METS      -Clinical risk factors:    -CAD: no history    -CHF: no history    -Cerebrovascular disease: no history    -Insulin-dependent DM: no history    -Creatinine > 2.0: no history     -Active cardiac conditions:    -MI within 30 days or current unstable / severe angina: no history.    -Decompensated CHF: no history.    -Significant arrhythmia (high grade AVB, new VT, SVT with HR >100, symptomatic bradycardia): no history.    -Severe aortic stenosis or symptomatic mitral stenosis: no history.     -History of prior cardiac workup: Yes. Has had 2 stress tests in the past which were normal (per pt and daughter)    -CORINA screen: (-) S (-) T (-) O (+) P      -Personal or Family History of Anesthesia Rxn: None     -Tobacco:Initiated at age 18-19 yo; Last use 1991; 1ppd   -EtOH: None  -Drugs: None    Current Outpatient Medications on File Prior to Visit:  ALFALFA ORAL, , Disp: , Rfl:   ascorbic acid, vitamin C, (VITAMIN C) 500 MG tablet, , Disp: , Rfl:   cholecalciferol, vitamin D3, (VITAMIN D3) 2,000 unit Cap, , Disp: , Rfl:   cyanocobalamin, vitamin B-12, (VITAMIN B-12) 5,000 mcg Subl, , Disp: , Rfl:   estradiol (ESTRACE) 0.01 % (0.1 mg/gram) vaginal cream, Rub pea-sized amount near urethral orifice every night x 2 weeks then three times weekly., Disp: 42.5 g, Rfl: 0  fexofenadine (ALLEGRA ALLERGY) 180 MG tablet, , Disp: , Rfl:   Lactobac no.41/Bifidobact no.7 (PROBIOTIC-10 ORAL), Take by  mouth., Disp: , Rfl:   MAGNESIUM GLYCINATE ORAL, , Disp: , Rfl:   methenamine (HIPREX) 1 gram Tab, Take 1 tablet (1 g total) by mouth 2 (two) times daily., Disp: 180 tablet, Rfl: 3  multivit-min-iron-vitamin K 18 mg iron-25 mcg Tab, , Disp: , Rfl:   omega 3-dha-epa-other om3-D3 2,200 mg-1,000 unit/5 mL Liqd, , Disp: , Rfl:   THEANINE ORAL, , Disp: , Rfl:   triamcinolone (NASACORT) 55 mcg nasal inhaler, , Disp: , Rfl:   trimethoprim (TRIMPEX) 100 mg Tab, Take 1 tablet (100 mg total) by mouth once daily., Disp: 30 tablet, Rfl: 2    No current facility-administered medications on file prior to visit.       Review of Systems   Constitutional: Negative for activity change and unexpected weight change.   HENT: Negative for hearing loss, rhinorrhea and trouble swallowing.    Eyes: Negative for discharge and visual disturbance.   Respiratory: Negative for chest tightness and wheezing.    Cardiovascular: Negative for chest pain and palpitations.   Gastrointestinal: Negative for blood in stool, constipation, diarrhea and vomiting.   Endocrine: Negative for polydipsia and polyuria.   Genitourinary: Negative for difficulty urinating, dysuria, hematuria and menstrual problem.   Musculoskeletal: Negative for arthralgias, joint swelling and neck pain.   Neurological: Negative for weakness and headaches.   Psychiatric/Behavioral: Negative for confusion and dysphoric mood.         Past Medical History:   Diagnosis Date    Allergy     C. difficile colitis     Frequent UTI     GERD (gastroesophageal reflux disease)     Hiatal hernia     Hypertension        Patient Active Problem List   Diagnosis    Osteopenia    Recurrent UTI    Elevated blood pressure reading in office with diagnosis of hypertension       Past Surgical History:   Procedure Laterality Date    APPENDECTOMY         Family History   Problem Relation Age of Onset    Colon cancer Maternal Aunt     Prostate cancer Neg Hx     Kidney disease Neg Hx     Breast  "cancer Neg Hx     Ovarian cancer Neg Hx        Social History     Tobacco Use   Smoking Status Former Smoker   Smokeless Tobacco Never Used       Social History     Social History Narrative    Lives with daughter (Sofia)    Permanent home is [place]       Objective:        Vitals:    12/10/19 1517 12/10/19 1540   BP: (!) 150/84 (!) 158/90   Pulse: 103 100   SpO2: 97%    Weight: 68.9 kg (151 lb 14.4 oz)    Height: 5' 3" (1.6 m)          Physical Exam   Constitutional: She is oriented to person, place, and time. She appears well-developed and well-nourished. No distress.   HENT:   Head: Normocephalic and atraumatic.   Right Ear: Tympanic membrane, external ear and ear canal normal.   Left Ear: Tympanic membrane, external ear and ear canal normal.   Nose: Nose normal. No mucosal edema or rhinorrhea. Right sinus exhibits no maxillary sinus tenderness and no frontal sinus tenderness. Left sinus exhibits no maxillary sinus tenderness and no frontal sinus tenderness.   Mouth/Throat: Oropharynx is clear and moist and mucous membranes are normal. No oropharyngeal exudate.   Eyes: Conjunctivae and EOM are normal. No scleral icterus.   Neck: Normal range of motion. Neck supple. No thyromegaly present.   Cardiovascular: Normal rate, regular rhythm and normal heart sounds. Exam reveals no gallop and no friction rub.   No murmur heard.  Pulmonary/Chest: Effort normal and breath sounds normal. She has no wheezes. She has no rales.   Abdominal: Soft. Bowel sounds are normal. She exhibits no distension and no mass. There is no tenderness.   Musculoskeletal: Normal range of motion. She exhibits no edema or deformity.    Neurological: She is alert and oriented to person, place, and time. She has normal strength. No cranial nerve deficit or sensory deficit.   Skin: Skin is warm and dry. No rash noted. No erythema.   Psychiatric: She has a normal mood and affect. Her behavior is normal.     Assessment:       1. Preoperative " examination    2. Elevated blood pressure reading in office with diagnosis of hypertension    3. Osteopenia, unspecified location    4. Recurrent UTI        Plan:       Tristin was seen today for pre-op exam.    Diagnoses and all orders for this visit:    Preoperative examination    Elevated blood pressure reading in office with diagnosis of hypertension    Osteopenia, unspecified location    Recurrent UTI      Preoperative Exam   Planning for cataract surgery of left eye with implant on 12/16/2019 with Dr. Sam Troncoso  Low Risk Surgery   ASA 3 (h/o HTN--BP is elevated today; previously well-controlled; not taking antihypertensive therapy at present)   CBC and CMP wnl 10/23/2019  EKG 10/23/2019 showed NSR with PACs   Mckinney score 0.73 %-no further cardiac work-up necessary  Though BP is is not optimal, patient is cleared to undergo procedure. Recommend not performing procedure if BP >160/100  Form completed    H/o HTN   Currently untreated.  Blood pressure is elevated today; at target during previous visit 10/23/2019.  She is asymptomatic.  Her daughter does state that patient has been anxious over the past week after learning a close family friend is dying of cancer.  Advised to record blood pressures at rest approximately 3 times per week for the next 4 weeks.  Will have patient return to the office in 4 weeks for blood pressure repeat visit.  Instructed to bring in cuff at that time.   Will restart antihypertensives as indicated.    Osteopenia   No DEXA on file  Continue MVI and Vitamin D3 1000 IU daily    Recurrent UTI   --Followed by urology Dr. Farrar  --Doing well with methenamine 1g BID + trimethoprim 100 mg daily    HM   Flu vaccine UTD for 2019/2020 season  Discuss pneumovax/tdap/shingrix on f/u     Follow up in about 4 weeks (around 1/7/2020).--BP check

## 2019-12-11 ENCOUNTER — PATIENT OUTREACH (OUTPATIENT)
Dept: ADMINISTRATIVE | Facility: OTHER | Age: 84
End: 2019-12-11

## 2019-12-11 ENCOUNTER — TELEPHONE (OUTPATIENT)
Dept: UROLOGY | Facility: CLINIC | Age: 84
End: 2019-12-11

## 2019-12-11 NOTE — TELEPHONE ENCOUNTER
Complains of symptoms of UTI.  Offered to schedule an appointment with another Urologist to accommodate for same day, deferred.  Patient accommodated for tomorrow, 12/12/19 at 1000 hours for evaluation with Dr Farrar.  Daughter accepted.  No complaints of fever or severe pain at time of call.

## 2019-12-11 NOTE — TELEPHONE ENCOUNTER
----- Message from Deedee Leija sent at 12/11/2019 11:27 AM CST -----  Contact: Daughter [phone]  Patient would like to speak with you about being seen today for an UTI. Please advise.

## 2019-12-12 ENCOUNTER — OFFICE VISIT (OUTPATIENT)
Dept: UROLOGY | Facility: CLINIC | Age: 84
End: 2019-12-12
Payer: MEDICARE

## 2019-12-12 VITALS
DIASTOLIC BLOOD PRESSURE: 95 MMHG | BODY MASS INDEX: 26.75 KG/M2 | WEIGHT: 151 LBS | HEART RATE: 110 BPM | SYSTOLIC BLOOD PRESSURE: 144 MMHG | HEIGHT: 63 IN

## 2019-12-12 DIAGNOSIS — N39.41 URGE URINARY INCONTINENCE: Primary | ICD-10-CM

## 2019-12-12 DIAGNOSIS — R39.15 URGENCY OF URINATION: ICD-10-CM

## 2019-12-12 DIAGNOSIS — R35.0 FREQUENCY OF URINATION: ICD-10-CM

## 2019-12-12 LAB
BILIRUB UR QL STRIP: NEGATIVE
CLARITY UR REFRACT.AUTO: CLEAR
COLOR UR AUTO: NORMAL
GLUCOSE UR QL STRIP: NEGATIVE
HGB UR QL STRIP: NEGATIVE
KETONES UR QL STRIP: NEGATIVE
LEUKOCYTE ESTERASE UR QL STRIP: NEGATIVE
MICROSCOPIC COMMENT: NORMAL
NITRITE UR QL STRIP: NEGATIVE
PH UR STRIP: 6 [PH] (ref 5–8)
PROT UR QL STRIP: NEGATIVE
SP GR UR STRIP: 1 (ref 1–1.03)
URN SPEC COLLECT METH UR: NORMAL

## 2019-12-12 PROCEDURE — 99999 PR PBB SHADOW E&M-EST. PATIENT-LVL IV: CPT | Mod: PBBFAC,,, | Performed by: STUDENT IN AN ORGANIZED HEALTH CARE EDUCATION/TRAINING PROGRAM

## 2019-12-12 PROCEDURE — 1125F PR PAIN SEVERITY QUANTIFIED, PAIN PRESENT: ICD-10-PCS | Mod: ,,, | Performed by: STUDENT IN AN ORGANIZED HEALTH CARE EDUCATION/TRAINING PROGRAM

## 2019-12-12 PROCEDURE — 99999 PR PBB SHADOW E&M-EST. PATIENT-LVL IV: ICD-10-PCS | Mod: PBBFAC,,, | Performed by: STUDENT IN AN ORGANIZED HEALTH CARE EDUCATION/TRAINING PROGRAM

## 2019-12-12 PROCEDURE — 51701 INSERT BLADDER CATHETER: CPT | Mod: PBBFAC,PO | Performed by: STUDENT IN AN ORGANIZED HEALTH CARE EDUCATION/TRAINING PROGRAM

## 2019-12-12 PROCEDURE — 99214 OFFICE O/P EST MOD 30 MIN: CPT | Mod: PBBFAC,PO | Performed by: STUDENT IN AN ORGANIZED HEALTH CARE EDUCATION/TRAINING PROGRAM

## 2019-12-12 PROCEDURE — 87086 URINE CULTURE/COLONY COUNT: CPT

## 2019-12-12 PROCEDURE — 1159F PR MEDICATION LIST DOCUMENTED IN MEDICAL RECORD: ICD-10-PCS | Mod: ,,, | Performed by: STUDENT IN AN ORGANIZED HEALTH CARE EDUCATION/TRAINING PROGRAM

## 2019-12-12 PROCEDURE — 1125F AMNT PAIN NOTED PAIN PRSNT: CPT | Mod: ,,, | Performed by: STUDENT IN AN ORGANIZED HEALTH CARE EDUCATION/TRAINING PROGRAM

## 2019-12-12 PROCEDURE — 51701 INSERT BLADDER CATHETER: CPT | Mod: S$PBB,,, | Performed by: STUDENT IN AN ORGANIZED HEALTH CARE EDUCATION/TRAINING PROGRAM

## 2019-12-12 PROCEDURE — 99214 PR OFFICE/OUTPT VISIT, EST, LEVL IV, 30-39 MIN: ICD-10-PCS | Mod: S$PBB,25,, | Performed by: STUDENT IN AN ORGANIZED HEALTH CARE EDUCATION/TRAINING PROGRAM

## 2019-12-12 PROCEDURE — 99214 OFFICE O/P EST MOD 30 MIN: CPT | Mod: S$PBB,25,, | Performed by: STUDENT IN AN ORGANIZED HEALTH CARE EDUCATION/TRAINING PROGRAM

## 2019-12-12 PROCEDURE — 1159F MED LIST DOCD IN RCRD: CPT | Mod: ,,, | Performed by: STUDENT IN AN ORGANIZED HEALTH CARE EDUCATION/TRAINING PROGRAM

## 2019-12-12 PROCEDURE — 51701 PR INSERTION OF NON-INDWELLING BLADDER CATHETERIZATION FOR RESIDUAL UR: ICD-10-PCS | Mod: S$PBB,,, | Performed by: STUDENT IN AN ORGANIZED HEALTH CARE EDUCATION/TRAINING PROGRAM

## 2019-12-12 PROCEDURE — 81001 URINALYSIS AUTO W/SCOPE: CPT

## 2019-12-12 RX ORDER — AMOXICILLIN AND CLAVULANATE POTASSIUM 875; 125 MG/1; MG/1
1 TABLET, FILM COATED ORAL EVERY 12 HOURS
Qty: 14 TABLET | Refills: 0 | Status: SHIPPED | OUTPATIENT
Start: 2019-12-12 | End: 2019-12-19

## 2019-12-12 NOTE — PATIENT INSTRUCTIONS
1. Stop daily trimpex while taking Augmentin antibiotics.  2. Take Augmentin for 7 days to treat possible UTI.  3. When urine culture from today results, office will contact you with results. If there is no UTI, you will be told to stop Augmentin. If there is a UTI but the bacteria is resistant to Augmentin, we will call in a different antibiotic.  4. After you have finished the recommended antibiotic course to treat the UTI, you should resume the trimpex.

## 2019-12-12 NOTE — PROGRESS NOTES
"Subjective:       Patient ID: Tristin Weeks is a 87 y.o. female.    Chief Complaint: Urinary Tract Infection  This is a 87 y.o.  female patient that is an established patient of mine.   She was originally referred to me by Dr. Anastasiya Escobedo for recurrent UTIs.  She is here today with her daughter Ms. Black (we had her sign permission today so if we are unable to reach the patient or if she doesn't understand, we have her permission to contact her daughter). She reports a history of urinary tract infections, approximately 2 per year. She states that her UTI symptoms- she "feels awful", frequency, denies dysuria, denies gross hematuria, her daughter describes that she is more combative and agitated when she may have a UTI. The patient reports that when she "begins to feel bad" she lets it "go on for awhile until it gets really bad and I want to die." She has a difficult time describing what symptoms escalate to the point of that severe level of bother. She believes when it is that severe she has a headache, difficulty walking, and feels like her legs are weak. Her daughter fills in the gaps and informed me that sometimes her mom becomes very agitated, combative, and has short term memory loss without recollection of their interactions or conversations.   She has been evaluated by Dr. Andino at [facility]. She was prescribed estrogen cream but stopped it because she "didn't like it." She feels like she recalls that it caused some local itchiness. She was also referred to pelvic floor therapy and feels like this therapy did help. She was told she has a cystocele but was not recommended any intervention for this and was told it was mild per the patient.   She drinks a cup of coffee every morning, then some water possibly 2 cups of water during the day.   US about 1 year ago. Her daughter feels like she recalls no other finding other than the kidneys were "smaller"  She moved to [place] from [place]. She lives in [facility] [place] for 6 months and in [place] for the other 6 months.   She is here today with her daughter - Sofia.   Urine culture history from outside reports (SCANNED INTO MEDIA at initial visit)  3/7/17 - mixed microbial population, no predominating organisms, probable contaminants  The next 4 urine culture results were obtained from a [employer] printout by the patient's daughter - no sensitivity results available  9/27/17 - <10K CFU multiple organisms, probably contaminants  9/27/17 - 50-100K CFU klebsiella oxytoca  11/2/17 - no growth  12/18/17 - >100K Raoultella ornithinolytica  (per outside ID consultant, this is a gram negative organism that used to be classified as klebsiella)  [facility] result:  2/20/18 - Raoultella planticola >100K - sensitive to: amox/clav, cefepime, ceftriaxone, cefuroxime, cephalothin, cipro, gent, imipenem, nitrofurantoin, tetracycline, tobramycin, bactrim; resistant to: ampicillin, piperacillin    They return back today 3/9/18 to followup her renal ultrasound results and for a catheterized urine to be obtained by me to send for a urine culture.     Review of records from [facility] and Dr. Andino's office-   Pt was recommended estrace cream.   UTI treated with cipro and levaquin in 6/2017.   Saw Dr. Andino 3/6/17for UTIs   3/7/17 - mixed microbial population, no predominating organisms; probable contaminants.   3/8/17 - insufficient growth   Pelvic floor physical therapy 3/16/17     Renal ultrasound (report only) - 3/9/17 - right kidney 9 x 4.8 x 4.8cm. No hydro or stones noted.   Left kidney - 9.4 x 4.1 x 5.4cm, subjective cortical atrophy. No hydro or stones noted.   Serum Cr 0.85 6/8/17     Urine culture 7/6/17 - klebsiella >100K sensitive to all except bactrim.     I personally reviewed the images: renal ultrasound 3/8/18 -The right kidney measures 8.8 cm in length.  There is no hydronephrosis.  The resistive index within a parenchymal artery is 0.6.    The left kidney measures 9.8 cm in length.  " There is no hydronephrosis. The resistive index within a parenchymal artery is 0.7.  The bladder is unremarkable. The post void residual is 22 mL.    12/7/18-  She saw Dr. Portillo, starting 11/5/18 she was maintained on a prophylactic antibiotic dose of macrobid 50mg daily. On 11/19/18 per Dr. Portillo's note "Will increase macrobid to 100 mg bid x 7 days. Will increase macrobid prophylaxis to 100 mg nightly"  Because of klebsiella UTI >100k on 11/16/18  Her hydration patterns have not changed since we last met. She drinks 2 cups of coffee daily. Drinks very little water.   Bowel movements - has a bowel movement every day. No straining or constipation.   Bmp from outside lab that her daughter was able to pull up 9/2018- serum creatinine 0.86  Both the patient and her daughter voiced frustration with not getting any resolution to her UTI's.     1/4/2019  At the visit on 12/7/18 I catheterized her and it demonstrated a klebsiella UTI for which she completed ceftin x 7 days. She was then taking trimethoprim daily for prophylaxis.   She added herself on to clinic today urgently. She was doing well until about 2-3 days ago she began to experience a recurrence of dysuria and urinary frequency. She also suffered a fall last week and has been experiencing back pain. Her daughter recalls that the patient was complaining of back pain before her fall. She patient came in today and experienced a strong urge to urinate and I catheterized her at the beginning of the visit.  Her daughter was trying to recall an anti-bacterial Dr. Plascencia prescribed that seemed to help with the patient's back pain? She will continue to do research and look into this medication and will message me back on the portal.     5/3/19  The patient returns back today after her daughter Sofia called our office. The patient was worried that she may be developing another UTI. The patient's UTI symptoms that she is experiencing are - increased nocturia (from " 2x/night to 3x/night). She is getting ready for another trip to [place], possibly getting preoccupied about the trip. She is very sensitive to antibiotics. Has allergies listed, she did not tolerate Augmentin in the past, etc.   Hydration - 1-2 cups, 1 bottle of Dewey water, possible another glass later in the day. She has tried to improve her hydration patterns since our last visit.  She has been taking the Hiprex 1g orally once daily and the trimethoprim 100mg once daily.     12/12/19  She returns back overall doing very well. She notes that recently she is stressed about an eye procedure coming up on Monday. She notes recently urinary urgency and frequency and mild dysuria. She became concerned for a UTI and is here today to discuss antibiotics and a catheterized urine sample.       Lab Results   Component Value Date    CREATININE 1.0 10/23/2019      ---  Past Medical History:   Diagnosis Date    Allergy     C. difficile colitis     Frequent UTI     GERD (gastroesophageal reflux disease)     Hiatal hernia     Hypertension        Past Surgical History:   Procedure Laterality Date    APPENDECTOMY         Family History   Problem Relation Age of Onset    Colon cancer Maternal Aunt     Prostate cancer Neg Hx     Kidney disease Neg Hx     Breast cancer Neg Hx     Ovarian cancer Neg Hx        Social History     Tobacco Use    Smoking status: Former Smoker    Smokeless tobacco: Never Used   Substance Use Topics    Alcohol use: No     Frequency: Never     Drinks per session: Patient refused     Binge frequency: Patient refused    Drug use: No       Current Outpatient Medications on File Prior to Visit   Medication Sig Dispense Refill    ALFALFA ORAL       ascorbic acid, vitamin C, (VITAMIN C) 500 MG tablet       cholecalciferol, vitamin D3, (VITAMIN D3) 2,000 unit Cap       cyanocobalamin, vitamin B-12, (VITAMIN B-12) 5,000 mcg Subl       estradiol (ESTRACE) 0.01 % (0.1 mg/gram) vaginal cream Rub  pea-sized amount near urethral orifice every night x 2 weeks then three times weekly. 42.5 g 0    fexofenadine (ALLEGRA ALLERGY) 180 MG tablet       Lactobac no.41/Bifidobact no.7 (PROBIOTIC-10 ORAL) Take by mouth.      MAGNESIUM GLYCINATE ORAL       methenamine (HIPREX) 1 gram Tab Take 1 tablet (1 g total) by mouth 2 (two) times daily. 180 tablet 3    multivit-min-iron-vitamin K 18 mg iron-25 mcg Tab       omega 3-dha-epa-other om3-D3 2,200 mg-1,000 unit/5 mL Liqd       THEANINE ORAL       triamcinolone (NASACORT) 55 mcg nasal inhaler       trimethoprim (TRIMPEX) 100 mg Tab Take 1 tablet (100 mg total) by mouth once daily. 30 tablet 2     No current facility-administered medications on file prior to visit.        Review of patient's allergies indicates:   Allergen Reactions    Premarin [conjugated estrogens] Other (See Comments)     Causes Dizziness       Review of Systems   Constitutional: Negative for activity change.   HENT: Negative for congestion.    Eyes: Negative for visual disturbance.   Respiratory: Negative for shortness of breath.    Cardiovascular: Negative for chest pain.   Gastrointestinal: Negative for abdominal distention.   Musculoskeletal: Negative for gait problem.   Skin: Negative for color change.   Neurological: Negative for dizziness.   Psychiatric/Behavioral: Negative for agitation.       Objective:      Physical Exam   Constitutional: She is oriented to person, place, and time. She appears well-developed.   HENT:   Head: Normocephalic and atraumatic.   Eyes: EOM are normal.   Neck: Normal range of motion.   Cardiovascular: Intact distal pulses.   Pulmonary/Chest: Effort normal.   Abdominal: Soft. She exhibits no distension. There is no tenderness.   Nonpalpable bladder   Genitourinary:   Genitourinary Comments: Urethral meatus easily identified, prepped with betadyne and in and out catheter passed to obtained catheterized urine sample for culture. Clear yellow urine obtained.    Musculoskeletal: Normal range of motion.   Neurological: She is alert and oriented to person, place, and time.   Skin: Skin is warm and dry.   Psychiatric: She has a normal mood and affect.       Assessment:       1. Urge urinary incontinence    2. Urgency of urination    3. Frequency of urination        Plan:       1. Will send catheterized urine for culture.  2. Patient is worried about a UTI and understands the culture will likely come back early next week. She would like to start on antibiotics while she is awaiting culture results, understanding that we may need to stop abx if no UTI or change abx if bacteria is resistant to Augmentin.  3. Augmentin x 7 days for empiric UTI treatment.  4. Printed instructions for patient to stop daily trimpex her daily prophylactic antibiotic while she is taking Augmentin and to resume once she has completed antibiotics for a UTI.  5. Will CC PCP Dr. Sanchez.    Urge urinary incontinence    Urgency of urination  -     Urinalysis  -     Urinalysis Microscopic  -     Urine culture    Frequency of urination  -     Urinalysis  -     Urinalysis Microscopic  -     Urine culture    Other orders  -     amoxicillin-clavulanate 875-125mg (AUGMENTIN) 875-125 mg per tablet; Take 1 tablet by mouth every 12 (twelve) hours. for 7 days  Dispense: 14 tablet; Refill: 0

## 2019-12-13 LAB — BACTERIA UR CULT: NO GROWTH

## 2019-12-19 ENCOUNTER — PATIENT MESSAGE (OUTPATIENT)
Dept: UROLOGY | Facility: CLINIC | Age: 84
End: 2019-12-19

## 2019-12-19 RX ORDER — TRIMETHOPRIM 100 MG/1
100 TABLET ORAL DAILY
Qty: 30 TABLET | Refills: 11 | Status: SHIPPED | OUTPATIENT
Start: 2019-12-19 | End: 2020-11-27 | Stop reason: SDUPTHER

## 2020-01-20 ENCOUNTER — OFFICE VISIT (OUTPATIENT)
Dept: FAMILY MEDICINE | Facility: CLINIC | Age: 85
End: 2020-01-20
Payer: MEDICARE

## 2020-01-20 VITALS
HEART RATE: 90 BPM | OXYGEN SATURATION: 95 % | HEIGHT: 63 IN | DIASTOLIC BLOOD PRESSURE: 90 MMHG | BODY MASS INDEX: 26.72 KG/M2 | SYSTOLIC BLOOD PRESSURE: 143 MMHG | WEIGHT: 150.81 LBS

## 2020-01-20 DIAGNOSIS — N39.0 RECURRENT UTI: ICD-10-CM

## 2020-01-20 DIAGNOSIS — Z01.818 PREOPERATIVE EXAMINATION: Primary | ICD-10-CM

## 2020-01-20 DIAGNOSIS — M85.80 OSTEOPENIA, UNSPECIFIED LOCATION: ICD-10-CM

## 2020-01-20 DIAGNOSIS — I10 ELEVATED BLOOD PRESSURE READING IN OFFICE WITH DIAGNOSIS OF HYPERTENSION: ICD-10-CM

## 2020-01-20 PROCEDURE — 99999 PR PBB SHADOW E&M-EST. PATIENT-LVL III: ICD-10-PCS | Mod: PBBFAC,,, | Performed by: FAMILY MEDICINE

## 2020-01-20 PROCEDURE — 1159F MED LIST DOCD IN RCRD: CPT | Mod: ,,, | Performed by: FAMILY MEDICINE

## 2020-01-20 PROCEDURE — 99213 OFFICE O/P EST LOW 20 MIN: CPT | Mod: PBBFAC,PO | Performed by: FAMILY MEDICINE

## 2020-01-20 PROCEDURE — 99999 PR PBB SHADOW E&M-EST. PATIENT-LVL III: CPT | Mod: PBBFAC,,, | Performed by: FAMILY MEDICINE

## 2020-01-20 PROCEDURE — 99214 OFFICE O/P EST MOD 30 MIN: CPT | Mod: S$PBB,,, | Performed by: FAMILY MEDICINE

## 2020-01-20 PROCEDURE — 1159F PR MEDICATION LIST DOCUMENTED IN MEDICAL RECORD: ICD-10-PCS | Mod: ,,, | Performed by: FAMILY MEDICINE

## 2020-01-20 PROCEDURE — 99214 PR OFFICE/OUTPT VISIT, EST, LEVL IV, 30-39 MIN: ICD-10-PCS | Mod: S$PBB,,, | Performed by: FAMILY MEDICINE

## 2020-01-20 PROCEDURE — 1126F AMNT PAIN NOTED NONE PRSNT: CPT | Mod: ,,, | Performed by: FAMILY MEDICINE

## 2020-01-20 PROCEDURE — 1126F PR PAIN SEVERITY QUANTIFIED, NO PAIN PRESENT: ICD-10-PCS | Mod: ,,, | Performed by: FAMILY MEDICINE

## 2020-01-20 NOTE — PROGRESS NOTES
Subjective:       Patient ID: Tristin Weeks is a 87 y.o. female.    Chief Complaint: Follow-up (bp check/ pre op clearance.)    88 yo F pt, newly established with me, with PMH significant for HTN (on no antihypertensive meds); Recurrent UTI; GERD; Allergy; C diff colitis; and Osteopenia. She presents for preoperative exam; accompanied in the office today by her daughter Sofia. Note that we attempted clearance on 12/10/2019, however, her BP was elevated to 158/90.  Since our last visit she has been monitoring her home blood pressures which have ranged from 125-144/75-89.  She Has not required antihypertensive therapy for hypertension in many years. Denies chest pain, blurry vision, headache, shortness of breath, dizziness, and palpitations.      -Type of Surgery: Cataract Surgery with implant of left eye   -Date of Surgery: 1/27/2020  -Surgery risk: Low risk surgery  -Surgeon: Dr. Sam Troncoso     -Functional capacity: > 4 METS      -Clinical risk factors:    -CAD: no history    -CHF: no history    -Cerebrovascular disease: no history    -Insulin-dependent DM: no history    -Creatinine > 2.0: no history     -Active cardiac conditions:    -MI within 30 days or current unstable / severe angina: no history.    -Decompensated CHF: no history.    -Significant arrhythmia (high grade AVB, new VT, SVT with HR >100, symptomatic bradycardia): no history.    -Severe aortic stenosis or symptomatic mitral stenosis: no history.     -History of prior cardiac workup: Yes. Has had 2 stress tests in the past which were normal (per pt and daughter)    -CORINA screen: (-) S (-) T (-) O (+) P      -Personal or Family History of Anesthesia Rxn: None     -Tobacco:Initiated at age 18-19 yo; Last use 1991; 1ppd   -EtOH: None  -Drugs: None    Outpatient Medications as of 1/20/2020:  ALFALFA ORAL, , Disp: , Rfl:   ascorbic acid, vitamin C, (VITAMIN C) 500 MG tablet, , Disp: , Rfl:   cholecalciferol, vitamin D3, (VITAMIN D3) 2,000 unit Cap, ,  Disp: , Rfl:   cyanocobalamin, vitamin B-12, (VITAMIN B-12) 5,000 mcg Subl, , Disp: , Rfl:   estradiol (ESTRACE) 0.01 % (0.1 mg/gram) vaginal cream, Rub pea-sized amount near urethral orifice every night x 2 weeks then three times weekly., Disp: 42.5 g, Rfl: 0  fexofenadine (ALLEGRA ALLERGY) 180 MG tablet, , Disp: , Rfl:   Lactobac no.41/Bifidobact no.7 (PROBIOTIC-10 ORAL), Take by mouth., Disp: , Rfl:   MAGNESIUM GLYCINATE ORAL, , Disp: , Rfl:   methenamine (HIPREX) 1 gram Tab, Take 1 tablet (1 g total) by mouth 2 (two) times daily., Disp: 180 tablet, Rfl: 3  multivit-min-iron-vitamin K 18 mg iron-25 mcg Tab, , Disp: , Rfl:   omega 3-dha-epa-other om3-D3 2,200 mg-1,000 unit/5 mL Liqd, , Disp: , Rfl:   THEANINE ORAL, , Disp: , Rfl:   triamcinolone (NASACORT) 55 mcg nasal inhaler, , Disp: , Rfl:   trimethoprim (TRIMPEX) 100 mg Tab, Take 1 tablet (100 mg total) by mouth once daily., Disp: 30 tablet, Rfl: 11    No current facility-administered medications on file as of 1/20/2020.       Review of Systems   Constitutional: Negative for activity change and unexpected weight change.   HENT: Negative for hearing loss, rhinorrhea and trouble swallowing.    Eyes: Negative for discharge and visual disturbance.   Respiratory: Negative for chest tightness and wheezing.    Cardiovascular: Negative for chest pain and palpitations.   Gastrointestinal: Negative for blood in stool, constipation, diarrhea and vomiting.   Endocrine: Negative for polydipsia and polyuria.   Genitourinary: Negative for difficulty urinating, dysuria, hematuria and menstrual problem.   Musculoskeletal: Negative for arthralgias, joint swelling and neck pain.   Neurological: Negative for weakness and headaches.   Psychiatric/Behavioral: Negative for confusion and dysphoric mood    Past Medical History:   Diagnosis Date    Allergy     C. difficile colitis     Frequent UTI     GERD (gastroesophageal reflux disease)     Hiatal hernia     Hypertension   "      Patient Active Problem List   Diagnosis    Osteopenia    Recurrent UTI    Elevated blood pressure reading in office with diagnosis of hypertension       Past Surgical History:   Procedure Laterality Date    APPENDECTOMY         Family History   Problem Relation Age of Onset    Colon cancer Maternal Aunt     Prostate cancer Neg Hx     Kidney disease Neg Hx     Breast cancer Neg Hx     Ovarian cancer Neg Hx        Social History     Tobacco Use   Smoking Status Former Smoker   Smokeless Tobacco Never Used       Social History     Social History Narrative    Lives with daughter (Sofia)    Permanent home is [place]       Objective:        Vitals:    01/20/20 1033 01/20/20 1119   BP: (!) 149/87 (!) 143/90   Pulse: 90 90   SpO2: 95%    Weight: 68.4 kg (150 lb 12.7 oz)    Height: 5' 3" (1.6 m)        Physical Exam   Constitutional: She is oriented to person, place, and time. She appears well-developed and well-nourished. No distress.   HENT:   Head: Normocephalic and atraumatic.   Right Ear: Tympanic membrane, external ear and ear canal normal.   Left Ear: Tympanic membrane, external ear and ear canal normal.   Nose: Nose normal. No mucosal edema or rhinorrhea. Right sinus exhibits no maxillary sinus tenderness and no frontal sinus tenderness. Left sinus exhibits no maxillary sinus tenderness and no frontal sinus tenderness.   Mouth/Throat: Oropharynx is clear and moist and mucous membranes are normal. No oropharyngeal exudate.   Eyes: Conjunctivae and EOM are normal. No scleral icterus.   Neck: Normal range of motion. Neck supple. No thyromegaly present.   Cardiovascular: Normal rate, regular rhythm and normal heart sounds. Exam reveals no gallop and no friction rub.   No murmur heard.  Pulmonary/Chest: Effort normal and breath sounds normal. She has no wheezes. She has no rales.   Abdominal: Soft. Bowel sounds are normal. She exhibits no distension and no mass. There is no tenderness.   Musculoskeletal: " Normal range of motion. She exhibits no edema or deformity.    Neurological: She is alert and oriented to person, place, and time. She has normal strength. No cranial nerve deficit or sensory deficit.   Skin: Skin is warm and dry. No rash noted. No erythema.   Psychiatric: She has a normal mood and affect. Her behavior is normal.     Assessment:       1. Preoperative examination    2. Elevated blood pressure reading in office with diagnosis of hypertension    3. Osteopenia, unspecified location    4. Recurrent UTI        Plan:       Tristin was seen today for follow-up.    Diagnoses and all orders for this visit:    Preoperative examination    Elevated blood pressure reading in office with diagnosis of hypertension    Osteopenia, unspecified location    Recurrent UTI      Preoperative Exam   Planning for cataract surgery of left eye with implant on 12/16/2019 with Dr. Sam Troncoso  Low Risk Surgery   ASA 2 (h/o HTN--BP is minimally elevated today for age, however, pretty well-controlled at home. She is not taking antihypertensive therapy at present)   CBC and CMP wnl 10/23/2019  EKG 10/23/2019 showed NSR with PACs   Mckinney score 0.19%-no further cardiac work-up necessary  Though BP is slightly above target,  patient is cleared to undergo procedure. Recommend not performing procedure if BP >160/100  Form will be completed    H/o HTN   Currently untreated.  Blood pressure is minimally elevated today; home Bps relatively well-controlled  She is asymptomatic.  Her daughter does state that patient continues to experiencing anxiety. She is not interested in daily anxiolytic therapy. Recommended psychotherapy--Given community behavioral resource handout. Can also try [email or web address].     Osteopenia   No DEXA on file  Continue MVI and Vitamin D3 1000 IU daily    Recurrent UTI   --Followed by urology Dr. Farrar  --Doing well with methenamine 1g BID + trimethoprim 100 mg daily    HM   Flu vaccine UTD for 2019/2020  season  Declines pneumovax/tdap/shingrix vaccines today 1/20/2020    F/U prn postoperatively

## 2020-02-23 ENCOUNTER — HEALTH MAINTENANCE LETTER (OUTPATIENT)
Age: 85
End: 2020-02-23

## 2020-10-17 ENCOUNTER — OFFICE VISIT (OUTPATIENT)
Dept: URGENT CARE | Facility: CLINIC | Age: 85
End: 2020-10-17
Payer: MEDICARE

## 2020-10-17 VITALS
OXYGEN SATURATION: 97 % | RESPIRATION RATE: 16 BRPM | SYSTOLIC BLOOD PRESSURE: 148 MMHG | BODY MASS INDEX: 26.58 KG/M2 | HEART RATE: 101 BPM | DIASTOLIC BLOOD PRESSURE: 82 MMHG | HEIGHT: 63 IN | WEIGHT: 150 LBS | TEMPERATURE: 97 F

## 2020-10-17 DIAGNOSIS — S50.812A CAT SCRATCH OF FOREARM, LEFT, INITIAL ENCOUNTER: ICD-10-CM

## 2020-10-17 DIAGNOSIS — S40.812A INFECTED ABRASION OF LEFT UPPER ARM, INITIAL ENCOUNTER: Primary | ICD-10-CM
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
## 2020-10-17 DIAGNOSIS — L08.9 INFECTED ABRASION OF LEFT UPPER ARM, INITIAL ENCOUNTER: Primary | ICD-10-CM

## 2020-10-17 PROCEDURE — 99213 PR OFFICE/OUTPT VISIT, EST, LEVL III, 20-29 MIN: ICD-10-PCS | Mod: S$GLB,,, | Performed by: PHYSICIAN ASSISTANT

## 2020-10-17 PROCEDURE — 99213 OFFICE O/P EST LOW 20 MIN: CPT | Mod: S$GLB,,, | Performed by: PHYSICIAN ASSISTANT

## 2020-10-17 RX ORDER — MUPIROCIN 20 MG/G
OINTMENT TOPICAL 2 TIMES DAILY
Qty: 15 G | Refills: 0 | Status: SHIPPED | OUTPATIENT
Start: 2020-10-17

## 2020-10-17 RX ORDER — CEPHALEXIN 500 MG/1
500 CAPSULE ORAL EVERY 8 HOURS
Qty: 21 CAPSULE | Refills: 0 | Status: SHIPPED | OUTPATIENT
Start: 2020-10-17 | End: 2020-10-24

## 2020-10-17 NOTE — PATIENT INSTRUCTIONS
PLEASE READ YOUR DISCHARGE INSTRUCTIONS ENTIRELY AS IT CONTAINS IMPORTANT INFORMATION.  - Rest.    - Drink plenty of fluids.    - Tylenol or Ibuprofen as directed as needed for fever/pain.    - If you were prescribed antibiotics, please take them to completion.  - If you are female and on birth control pills - please use additional methods of contraception to prevent pregnancy while on antibiotics and for one cycle after.   - If you were prescribed a narcotic medication or muscle relaxer, do not drive or operate heavy equipment or machinery while taking these medications, as they can cause drowsiness.   - If you smoke, please stop smoking.  -You must understand that you've received an Urgent Care treatment only and that you may be released before all your medical problems are known or treated. You, the patient, will    arrange for follow up care as instructed. Please arrange follow up with your primary medical clinic as soon as possible.   - Follow up with your PCP or specialty clinic as directed in the next 1-2 weeks if not improved or as needed.  You can call [phone] to schedule an appointment with the appropriate provider.    - Please return to Urgent Care or to the Emergency Department if your symptoms worsen.    Patient aware and verbalized understanding.    Animal Bites and Scratches     Healthcare provider giving injection in man's arm.     Most bites and scratches from household pets are nothing to worry about. But some bites or scratches can be serious. Others may become infected or pose the risk of rabies. For that reason, it's best to seek medical treatment for all but the most minor bites.  Report severe animal bites to animal control or your local health department.   When to go to the emergency room (ER)  A bite that barely breaks the skin usually isn't cause for concern. But seek emergency medical care if you:  · Have a deep puncture wound or badly torn skin  · Have redness, swelling, or warmth  near the wound  · Think you may have a broken bone or other serious injury  · The attack was unprovoked and you don't know the animal (rabies must be ruled out)  · Are bitten by a domestic cat or a wild animal, such as a skunk, raccoon, or bat  · Do not have a spleen or have a weak immune system  What to expect in the ER  · The wound will be carefully cleaned and inspected.  · X-rays may be taken if deep damage is suspected or to make sure a small piece of the animal's tooth is not left embedded in the wound.  · Although not common, infection can occur, especially if you have a cat bite, deep wound, or weak immune system. You may be given antibiotics to help prevent this.  · You may be given a tetanus shot if you haven't had one in the past 5 years. If rabies is a concern, you may be given shots to protect you.  · If serious tissue or joint damage has been done, you may be referred to a plastic or orthopedic surgeon.  Follow-up care  You will likely need to see your doctor within a day or two of receiving emergency medical treatment. In the meantime, watch for signs of infection. These include:  · Fever of 100.4°F (38°C) or higher, or as directed by your healthcare provider  · Swelling  · Redness  · Pus draining from the wound  Date Last Reviewed: 12/1/2016  © 7710-8509 [employer]. [address] [place] [address]. All rights reserved. This information is not intended as a substitute for professional medical care. Always follow your healthcare professional's instructions.        Wound Check, Infection  You have a wound that has become infected. The wound will not heal properly unless the infection is cleared. Infection in a wound may also spread if it is not treated. In most cases, antibiotic medicines are prescribed to treat a wound infection.   Symptoms of a wound infection include:  · Redness or swelling around the wound  · Warmth coming from the wound  · New or worsening pain  · Red  streaks around the wound  · Draining pus  · Fever  Home care  Follow all directions you are given to treat the infection.  Medicines  Take all medicines as prescribed.   · If you were given antibiotics, take them until they are gone or your healthcare provider tells you to stop. It is vital to finish the antibiotics even if you feel better. If you do not finish them, the infection may come back and be harder to treat.  · If your infection is not responding to the medicines you are taking, you may be prescribed new medicines.  · Take medicine for pain as directed by your healthcare provider.  Wound care  Care for your wound as directed by your healthcare provider.  · Apply a warm compress (clean cloth soaked in hot water) to the infected area for about 5 to 10 minutes at a time. Be very careful not to burn yourself. Test the cloth on a non-infected area to make sure it is not too hot.  · Continue to change the dressing daily. If it becomes wet, stained with wound fluid, or dirty, change it sooner. To change it:  ¨ Wash your hands with soap and water before changing the dressing.  ¨ Carefully remove the dressing and tape. If it sticks to the wound, you may need to wet it a little to remove it. (Do not do this if your healthcare provider has told you not to.)  ¨ Gently clean the wound with clean water (or saline) using gauze, a clean washcloth, or cotton swab.  ¨ Do not use soap, alcohol, peroxide or other cleansers.  ¨ If you were told to dry the wound before putting on a new dressing, gently pat. Do not rub.  ¨ Throw out the old dressing.  ¨ Wash your hands again before opening the new, clean dressing.  ¨ Wash your hands again when you are done.  Follow-up care  Follow up with your healthcare provider as advised. If a culture was done, you will be notified if your treatment needs to change. Call as directed for the results.  When to seek medical advice  Call your health care provider right away if any of these  occur:  · Symptoms of infection don't start to improve within 2 days of starting antibiotics  · Symptoms of infection get worse  · New symptoms, such as red streaks around the wound  · Fever of 100.4°F (38.0°C) or higher for more than 2 days after starting the antibiotics  Date Last Reviewed: 8/10/2015  © 0836-2319 [employer]. [address] [place] [address]. All rights reserved. This information is not intended as a substitute for professional medical care. Always follow your healthcare professional's instructions.

## 2020-10-17 NOTE — PROGRESS NOTES
"Subjective:       Patient ID: Tristin Weeks is a 88 y.o. female.    Vitals:  height is 5' 3" (1.6 m) and weight is 68 kg (150 lb). Her oral temperature is 97.3 °F (36.3 °C). Her blood pressure is 148/82 (abnormal) and her pulse is 101. Her respiration is 16 and oxygen saturation is 97%.     Chief Complaint: Animal Scratch (cat)    Ms. Weeks presents for evaluation of cat scratch to left forearm that occurred yesterday.  She was feeding her daughter's cat and the cat scratched her.  She denies any cat bite.  She denies any fevers, chills, lymphadenopathy.  The scratches were slightly red yesterday, but are much more red and swollen and hot today.  She denies any drainage from the wounds.  Her tetanus status is up-to-date.  She has tried Neosporin with little relief.    Animal Bite   The incident occurred yesterday. The incident occurred at home. There is an injury to the left forearm and left wrist. The pain is mild. It is unlikely that a foreign body is present. Associated symptoms include cough. Pertinent negatives include no chest pain, no fussiness, no numbness, no visual disturbance, no abdominal pain, no nausea, no vomiting, no bladder incontinence, no headaches, no hearing loss, no light-headedness and no weakness. There have been no prior injuries to these areas. She is right-handed. Her tetanus status is unknown. She has been behaving normally. There were no sick contacts.       Constitution: Negative for chills, sweating, fatigue and fever.   HENT: Negative for hearing loss, congestion and sore throat.    Neck: Negative for painful lymph nodes.   Cardiovascular: Negative for chest trauma, chest pain, leg swelling, palpitations, sob on exertion and passing out.   Eyes: Negative for double vision and blurred vision.   Respiratory: Positive for cough. Negative for shortness of breath.    Gastrointestinal: Negative for abdominal pain, nausea, vomiting and diarrhea.   Genitourinary: Negative for dysuria, " frequency, urgency, bladder incontinence and history of kidney stones.   Musculoskeletal: Negative for joint pain, joint swelling, muscle cramps and muscle ache.   Skin: Positive for wound and erythema. Negative for color change, pale, rash, puncture wound and bruising.   Allergic/Immunologic: Negative for seasonal allergies.   Neurological: Negative for dizziness, history of vertigo, light-headedness, passing out, facial drooping, speech difficulty, coordination disturbances, loss of balance, headaches and numbness.   Hematologic/Lymphatic: Negative for swollen lymph nodes.   Psychiatric/Behavioral: Negative for nervous/anxious, sleep disturbance and depression. The patient is not nervous/anxious.        Objective:      Physical Exam   Constitutional: She is oriented to person, place, and time. She appears well-developed.   HENT:   Head: Normocephalic and atraumatic. Head is without abrasion, without contusion and without laceration.   Ears:   Right Ear: External ear normal.   Left Ear: External ear normal.   Nose: Nose normal.   Mouth/Throat: Oropharynx is clear and moist and mucous membranes are normal.   Eyes: Pupils are equal, round, and reactive to light. Conjunctivae, EOM and lids are normal.   Neck: Trachea normal, full passive range of motion without pain and phonation normal. Neck supple.   Cardiovascular: Normal rate, regular rhythm and normal heart sounds.   Pulmonary/Chest: Effort normal and breath sounds normal. No stridor. No respiratory distress.   Musculoskeletal: Normal range of motion.   Neurological: She is alert and oriented to person, place, and time.   Skin: Skin is warm, dry, intact and no rash. Capillary refill takes less than 2 seconds. Lesions:  erythemaabrasion, burn, bruising and ecchymosis     Psychiatric: Her speech is normal and behavior is normal. Judgment and thought content normal.   Nursing note and vitals reviewed.        Assessment:       1. Infected abrasion of left upper arm,  initial encounter    2. Cat scratch of forearm, left, initial encounter        Plan:         Infected abrasion of left upper arm, initial encounter    Cat scratch of forearm, left, initial encounter    Other orders  -     mupirocin (BACTROBAN) 2 % ointment; Apply topically 2 (two) times daily.  Dispense: 15 g; Refill: 0  -     cephALEXin (KEFLEX) 500 MG capsule; Take 1 capsule (500 mg total) by mouth every 8 (eight) hours. for 7 days  Dispense: 21 capsule; Refill: 0    Diagnoses and plan discussed with the patient, as well as the expected course and duration of her symptoms. All questions and concerns were addressed prior to discharge.  She was advised to follow up with her PCP within 1 week if symptoms do not improve. Emergency department precautions were given. Patient verbalized understanding and was happy with the plan of care.     Patient Instructions     PLEASE READ YOUR DISCHARGE INSTRUCTIONS ENTIRELY AS IT CONTAINS IMPORTANT INFORMATION.  - Rest.    - Drink plenty of fluids.    - Tylenol or Ibuprofen as directed as needed for fever/pain.    - If you were prescribed antibiotics, please take them to completion.  - If you are female and on birth control pills - please use additional methods of contraception to prevent pregnancy while on antibiotics and for one cycle after.   - If you were prescribed a narcotic medication or muscle relaxer, do not drive or operate heavy equipment or machinery while taking these medications, as they can cause drowsiness.   - If you smoke, please stop smoking.  -You must understand that you've received an Urgent Care treatment only and that you may be released before all your medical problems are known or treated. You, the patient, will    arrange for follow up care as instructed. Please arrange follow up with your primary medical clinic as soon as possible.   - Follow up with your PCP or specialty clinic as directed in the next 1-2 weeks if not improved or as needed.  You can  call [phone] to schedule an appointment with the appropriate provider.    - Please return to Urgent Care or to the Emergency Department if your symptoms worsen.    Patient aware and verbalized understanding.    Animal Bites and Scratches     Healthcare provider giving injection in man's arm.     Most bites and scratches from household pets are nothing to worry about. But some bites or scratches can be serious. Others may become infected or pose the risk of rabies. For that reason, it's best to seek medical treatment for all but the most minor bites.  Report severe animal bites to animal control or your local health department.   When to go to the emergency room (ER)  A bite that barely breaks the skin usually isn't cause for concern. But seek emergency medical care if you:  · Have a deep puncture wound or badly torn skin  · Have redness, swelling, or warmth near the wound  · Think you may have a broken bone or other serious injury  · The attack was unprovoked and you don't know the animal (rabies must be ruled out)  · Are bitten by a domestic cat or a wild animal, such as a skunk, raccoon, or bat  · Do not have a spleen or have a weak immune system  What to expect in the ER  · The wound will be carefully cleaned and inspected.  · X-rays may be taken if deep damage is suspected or to make sure a small piece of the animal's tooth is not left embedded in the wound.  · Although not common, infection can occur, especially if you have a cat bite, deep wound, or weak immune system. You may be given antibiotics to help prevent this.  · You may be given a tetanus shot if you haven't had one in the past 5 years. If rabies is a concern, you may be given shots to protect you.  · If serious tissue or joint damage has been done, you may be referred to a plastic or orthopedic surgeon.  Follow-up care  You will likely need to see your doctor within a day or two of receiving emergency medical treatment. In the meantime, watch  for signs of infection. These include:  · Fever of 100.4°F (38°C) or higher, or as directed by your healthcare provider  · Swelling  · Redness  · Pus draining from the wound  Date Last Reviewed: 12/1/2016 © 2000-2017 [employer]. [address] [place] [address]. All rights reserved. This information is not intended as a substitute for professional medical care. Always follow your healthcare professional's instructions.        Wound Check, Infection  You have a wound that has become infected. The wound will not heal properly unless the infection is cleared. Infection in a wound may also spread if it is not treated. In most cases, antibiotic medicines are prescribed to treat a wound infection.   Symptoms of a wound infection include:  · Redness or swelling around the wound  · Warmth coming from the wound  · New or worsening pain  · Red streaks around the wound  · Draining pus  · Fever  Home care  Follow all directions you are given to treat the infection.  Medicines  Take all medicines as prescribed.   · If you were given antibiotics, take them until they are gone or your healthcare provider tells you to stop. It is vital to finish the antibiotics even if you feel better. If you do not finish them, the infection may come back and be harder to treat.  · If your infection is not responding to the medicines you are taking, you may be prescribed new medicines.  · Take medicine for pain as directed by your healthcare provider.  Wound care  Care for your wound as directed by your healthcare provider.  · Apply a warm compress (clean cloth soaked in hot water) to the infected area for about 5 to 10 minutes at a time. Be very careful not to burn yourself. Test the cloth on a non-infected area to make sure it is not too hot.  · Continue to change the dressing daily. If it becomes wet, stained with wound fluid, or dirty, change it sooner. To change it:  ¨ Wash your hands with soap and water before  changing the dressing.  ¨ Carefully remove the dressing and tape. If it sticks to the wound, you may need to wet it a little to remove it. (Do not do this if your healthcare provider has told you not to.)  ¨ Gently clean the wound with clean water (or saline) using gauze, a clean washcloth, or cotton swab.  ¨ Do not use soap, alcohol, peroxide or other cleansers.  ¨ If you were told to dry the wound before putting on a new dressing, gently pat. Do not rub.  ¨ Throw out the old dressing.  ¨ Wash your hands again before opening the new, clean dressing.  ¨ Wash your hands again when you are done.  Follow-up care  Follow up with your healthcare provider as advised. If a culture was done, you will be notified if your treatment needs to change. Call as directed for the results.  When to seek medical advice  Call your health care provider right away if any of these occur:  · Symptoms of infection don't start to improve within 2 days of starting antibiotics  · Symptoms of infection get worse  · New symptoms, such as red streaks around the wound  · Fever of 100.4°F (38.0°C) or higher for more than 2 days after starting the antibiotics  Date Last Reviewed: 8/10/2015  © 8589-7164 [employer]. [address] [place] [address], [place] [address]. All rights reserved. This information is not intended as a substitute for professional medical care. Always follow your healthcare professional's instructions.

## 2020-11-02 ENCOUNTER — OFFICE VISIT (OUTPATIENT)
Dept: FAMILY MEDICINE | Facility: CLINIC | Age: 85
End: 2020-11-02
Payer: MEDICARE

## 2020-11-02 VITALS
HEIGHT: 63 IN | SYSTOLIC BLOOD PRESSURE: 120 MMHG | WEIGHT: 148.13 LBS | BODY MASS INDEX: 26.25 KG/M2 | HEART RATE: 100 BPM | DIASTOLIC BLOOD PRESSURE: 86 MMHG | TEMPERATURE: 98 F | OXYGEN SATURATION: 95 %

## 2020-11-02 DIAGNOSIS — N39.0 RECURRENT UTI: ICD-10-CM

## 2020-11-02 DIAGNOSIS — Z23 NEEDS FLU SHOT: ICD-10-CM

## 2020-11-02 DIAGNOSIS — Z00.00 ROUTINE ADULT HEALTH MAINTENANCE: Primary | ICD-10-CM

## 2020-11-02 DIAGNOSIS — I10 ELEVATED BLOOD PRESSURE READING IN OFFICE WITH DIAGNOSIS OF HYPERTENSION: ICD-10-CM

## 2020-11-02 DIAGNOSIS — M85.80 OSTEOPENIA, UNSPECIFIED LOCATION: ICD-10-CM

## 2020-11-02 DIAGNOSIS — E78.5 HYPERLIPIDEMIA, UNSPECIFIED HYPERLIPIDEMIA TYPE: ICD-10-CM

## 2020-11-02 PROCEDURE — 99213 PR OFFICE/OUTPT VISIT, EST, LEVL III, 20-29 MIN: ICD-10-PCS | Mod: S$PBB,,, | Performed by: INTERNAL MEDICINE

## 2020-11-02 PROCEDURE — 99999 PR PBB SHADOW E&M-EST. PATIENT-LVL V: ICD-10-PCS | Mod: PBBFAC,,, | Performed by: INTERNAL MEDICINE

## 2020-11-02 PROCEDURE — 99213 OFFICE O/P EST LOW 20 MIN: CPT | Mod: S$PBB,,, | Performed by: INTERNAL MEDICINE

## 2020-11-02 PROCEDURE — 90686 IIV4 VACC NO PRSV 0.5 ML IM: CPT | Mod: PBBFAC,PO

## 2020-11-02 PROCEDURE — 99999 PR PBB SHADOW E&M-EST. PATIENT-LVL V: CPT | Mod: PBBFAC,,, | Performed by: INTERNAL MEDICINE

## 2020-11-02 PROCEDURE — 99215 OFFICE O/P EST HI 40 MIN: CPT | Mod: PBBFAC,PO,25 | Performed by: INTERNAL MEDICINE

## 2020-11-02 NOTE — PROGRESS NOTES
Subjective:       Patient ID: Tristin Weeks is a 88 y.o. female.    Chief Complaint: Annual Exam      The patient is here for annual evaluation. She monitors her blood pressure and was noted mildly elevated with diastolic in the low 90s when she was recovering from a cat bite. She has been checking her blood pressure daily and has been normal. Her weight has been stable, stays active but does not exercise, has hearing loss needing hearing aid and sees her ENT 1-2 times a year. She used to be treated in the past for HTN and HLD, but after an episode of C diff years ago started probiotics and has been off maintenance medications since then.  Has history of recurrent UTI but since urologist prescribed Trimethoprim and methenamine has been with no trouble urinating or signs of infection.    Review of Systems   Constitutional: Negative for activity change, appetite change, chills, fatigue, fever and unexpected weight change.   HENT: Positive for hearing loss. Negative for ear pain, rhinorrhea and trouble swallowing.    Eyes: Negative for discharge, redness and visual disturbance.   Respiratory: Positive for cough. Negative for chest tightness and wheezing.    Cardiovascular: Negative for chest pain, palpitations and leg swelling.   Gastrointestinal: Negative for blood in stool, constipation, diarrhea, nausea and vomiting.   Endocrine: Negative for polydipsia and polyuria.   Genitourinary: Negative for bladder incontinence, difficulty urinating, dysuria, hematuria and menstrual problem.   Musculoskeletal: Negative for arthralgias, joint swelling and neck pain.   Neurological: Negative for weakness, headaches and memory loss.   Hematological: Does not bruise/bleed easily.   Psychiatric/Behavioral: Negative for confusion, dysphoric mood and sleep disturbance.      Past Medical History:   Diagnosis Date    Allergy     C. difficile colitis     Frequent UTI     GERD (gastroesophageal reflux disease)     Hiatal hernia      Hypertension        Past Surgical History:   Procedure Laterality Date    APPENDECTOMY         Family History   Problem Relation Age of Onset    Colon cancer Maternal Aunt     Prostate cancer Neg Hx     Kidney disease Neg Hx     Breast cancer Neg Hx     Ovarian cancer Neg Hx        Social History     Socioeconomic History    Marital status:      Spouse name: Not on file    Number of children: Not on file    Years of education: Not on file    Highest education level: Not on file   Occupational History    Not on file   Social Needs    Financial resource strain: Not on file    Food insecurity     Worry: Not on file     Inability: Not on file    Transportation needs     Medical: Not on file     Non-medical: Not on file   Tobacco Use    Smoking status: Former Smoker    Smokeless tobacco: Never Used   Substance and Sexual Activity    Alcohol use: No     Frequency: Never     Drinks per session: Patient refused     Binge frequency: Patient refused    Drug use: No    Sexual activity: Never   Lifestyle    Physical activity     Days per week: Not on file     Minutes per session: Not on file    Stress: Not on file   Relationships    Social connections     Talks on phone: Patient refused     Gets together: Patient refused     Attends [employer] service: Not on file     Active member of club or organization: Yes     Attends meetings of clubs or organizations: 1 to 4 times per year     Relationship status: Patient refused   Other Topics Concern    Not on file   Social History Narrative    Lives with daughter (Sofia)    Permanent home is [place]       Current Outpatient Medications   Medication Sig Dispense Refill    ascorbic acid, vitamin C, (VITAMIN C) 500 MG tablet       cholecalciferol, vitamin D3, (VITAMIN D3) 2,000 unit Cap       cyanocobalamin, vitamin B-12, (VITAMIN B-12) 5,000 mcg Subl       estradiol (ESTRACE) 0.01 % (0.1 mg/gram) vaginal cream Rub pea-sized amount near urethral orifice  "every night x 2 weeks then three times weekly. 42.5 g 0    fexofenadine (ALLEGRA ALLERGY) 180 MG tablet       MAGNESIUM GLYCINATE ORAL       methenamine (HIPREX) 1 gram Tab Take 1 tablet (1 g total) by mouth 2 (two) times daily. 180 tablet 3    multivit-min-iron-vitamin K 18 mg iron-25 mcg Tab       mupirocin (BACTROBAN) 2 % ointment Apply topically 2 (two) times daily. 15 g 0    omega 3-dha-epa-other om3-D3 2,200 mg-1,000 unit/5 mL Liqd       trimethoprim (TRIMPEX) 100 mg Tab Take 1 tablet (100 mg total) by mouth once daily. 30 tablet 11    ALFALFA ORAL       Lactobac no.41/Bifidobact no.7 (PROBIOTIC-10 ORAL) Take by mouth.      THEANINE ORAL       triamcinolone (NASACORT) 55 mcg nasal inhaler        No current facility-administered medications for this visit.        Review of patient's allergies indicates:   Allergen Reactions    Premarin [conjugated estrogens] Other (See Comments)     Causes Dizziness         Objective:       Last 3 sets of Vitals    Vitals - 1 value per visit 1/20/2020 10/17/2020 11/2/2020   SYSTOLIC 143 148 120   DIASTOLIC 90 82 86   PULSE 90 101 100   TEMPERATURE - 97.3 98.4   RESPIRATIONS - 16 -   SPO2 95 97 95   Weight (lb) 150.79 150 148.15   Weight (kg) 68.4 68.04 67.2   HEIGHT 5' 3" 5' 3" 5' 3"   BODY MASS INDEX 26.71 26.57 26.24   VISIT REPORT - - -   Pain Score  0 4 0   Physical Exam  Constitutional:       General: She is not in acute distress.     Appearance: Normal appearance. She is normal weight.   HENT:      Head: Normocephalic.      Right Ear: Tympanic membrane, ear canal and external ear normal.      Left Ear: Tympanic membrane normal.      Ears:      Comments: Wax on right >left external ear canal.     Nose: Nose normal.      Mouth/Throat:      Mouth: Mucous membranes are moist.      Pharynx: Oropharynx is clear.   Eyes:      General: No scleral icterus.     Extraocular Movements: Extraocular movements intact.      Conjunctiva/sclera: Conjunctivae normal.      " Pupils: Pupils are equal, round, and reactive to light.   Neck:      Musculoskeletal: Neck supple.      Vascular: No carotid bruit.   Cardiovascular:      Rate and Rhythm: Normal rate and regular rhythm.      Pulses: Normal pulses.      Heart sounds: Normal heart sounds.   Pulmonary:      Effort: Pulmonary effort is normal.      Breath sounds: Normal breath sounds.   Abdominal:      General: Bowel sounds are normal. There is no distension.      Palpations: Abdomen is soft. There is no mass.      Tenderness: There is no abdominal tenderness.   Musculoskeletal: Normal range of motion.         General: No swelling.   Lymphadenopathy:      Cervical: No cervical adenopathy.   Skin:     General: Skin is warm and dry.   Neurological:      General: No focal deficit present.      Mental Status: She is alert and oriented to person, place, and time.   Psychiatric:         Mood and Affect: Mood normal.         Behavior: Behavior normal.                 Assessment:       1. Routine adult health maintenance    2. Elevated blood pressure reading in office with diagnosis of hypertension    3. Hyperlipidemia, unspecified hyperlipidemia type    4. Recurrent UTI    5. Osteopenia, unspecified location    6. Needs flu shot        Plan:       Tristin was seen today for annual exam.    Diagnoses and all orders for this visit:    Routine adult health maintenance  -    Stable exam.    - CBC Auto Differential; Future  -     Comprehensive Metabolic Panel; Future  -     Lipid Panel; Future  -     TSH; Future  - Vit D level    Elevated blood pressure reading in office with diagnosis of hypertension  -     Blood pressure stable with no maintenance medications. Continue to monitor with lifestyle modifications.    Hyperlipidemia, unspecified hyperlipidemia type  -     Lipid Panel; Future  -     TSH; Future    Recurrent UTI   - Stable with present treatment for prevention.    Osteopenia, unspecified location  -     Vitamin D; Future    Needs flu  shot  -     Influenza - Quadrivalent *Preferred* (6 months+) (PF)

## 2020-11-03 ENCOUNTER — LAB VISIT (OUTPATIENT)
Dept: LAB | Facility: HOSPITAL | Age: 85
End: 2020-11-03
Attending: INTERNAL MEDICINE
Payer: MEDICARE

## 2020-11-03 DIAGNOSIS — I10 ELEVATED BLOOD PRESSURE READING IN OFFICE WITH DIAGNOSIS OF HYPERTENSION: ICD-10-CM

## 2020-11-03 DIAGNOSIS — Z00.00 ROUTINE ADULT HEALTH MAINTENANCE: ICD-10-CM

## 2020-11-03 DIAGNOSIS — M85.80 OSTEOPENIA, UNSPECIFIED LOCATION: ICD-10-CM

## 2020-11-03 DIAGNOSIS — E78.5 HYPERLIPIDEMIA, UNSPECIFIED HYPERLIPIDEMIA TYPE: ICD-10-CM

## 2020-11-03 LAB
25(OH)D3+25(OH)D2 SERPL-MCNC: 52 NG/ML (ref 30–96)
ALBUMIN SERPL BCP-MCNC: 3.7 G/DL (ref 3.5–5.2)
ALP SERPL-CCNC: 81 U/L (ref 55–135)
ALT SERPL W/O P-5'-P-CCNC: 20 U/L (ref 10–44)
ANION GAP SERPL CALC-SCNC: 12 MMOL/L (ref 8–16)
AST SERPL-CCNC: 23 U/L (ref 10–40)
BASOPHILS # BLD AUTO: 0.09 K/UL (ref 0–0.2)
BASOPHILS NFR BLD: 1 % (ref 0–1.9)
BILIRUB SERPL-MCNC: 0.6 MG/DL (ref 0.1–1)
BUN SERPL-MCNC: 21 MG/DL (ref 8–23)
CALCIUM SERPL-MCNC: 9.2 MG/DL (ref 8.7–10.5)
CHLORIDE SERPL-SCNC: 106 MMOL/L (ref 95–110)
CHOLEST SERPL-MCNC: 235 MG/DL (ref 120–199)
CHOLEST/HDLC SERPL: 3.8 {RATIO} (ref 2–5)
CO2 SERPL-SCNC: 23 MMOL/L (ref 23–29)
CREAT SERPL-MCNC: 1 MG/DL (ref 0.5–1.4)
DIFFERENTIAL METHOD: NORMAL
EOSINOPHIL # BLD AUTO: 0.3 K/UL (ref 0–0.5)
EOSINOPHIL NFR BLD: 3.6 % (ref 0–8)
ERYTHROCYTE [DISTWIDTH] IN BLOOD BY AUTOMATED COUNT: 14.1 % (ref 11.5–14.5)
EST. GFR  (AFRICAN AMERICAN): 58 ML/MIN/1.73 M^2
EST. GFR  (NON AFRICAN AMERICAN): 50 ML/MIN/1.73 M^2
GLUCOSE SERPL-MCNC: 111 MG/DL (ref 70–110)
HCT VFR BLD AUTO: 45.3 % (ref 37–48.5)
HDLC SERPL-MCNC: 62 MG/DL (ref 40–75)
HDLC SERPL: 26.4 % (ref 20–50)
HGB BLD-MCNC: 14.5 G/DL (ref 12–16)
IMM GRANULOCYTES # BLD AUTO: 0.04 K/UL (ref 0–0.04)
IMM GRANULOCYTES NFR BLD AUTO: 0.4 % (ref 0–0.5)
LDLC SERPL CALC-MCNC: 133.2 MG/DL (ref 63–159)
LYMPHOCYTES # BLD AUTO: 2.4 K/UL (ref 1–4.8)
LYMPHOCYTES NFR BLD: 27.3 % (ref 18–48)
MCH RBC QN AUTO: 28.1 PG (ref 27–31)
MCHC RBC AUTO-ENTMCNC: 32 G/DL (ref 32–36)
MCV RBC AUTO: 88 FL (ref 82–98)
MONOCYTES # BLD AUTO: 0.7 K/UL (ref 0.3–1)
MONOCYTES NFR BLD: 7.8 % (ref 4–15)
NEUTROPHILS # BLD AUTO: 5.4 K/UL (ref 1.8–7.7)
NEUTROPHILS NFR BLD: 59.9 % (ref 38–73)
NONHDLC SERPL-MCNC: 173 MG/DL
NRBC BLD-RTO: 0 /100 WBC
PLATELET # BLD AUTO: 222 K/UL (ref 150–350)
PMV BLD AUTO: 11.9 FL (ref 9.2–12.9)
POTASSIUM SERPL-SCNC: 4.5 MMOL/L (ref 3.5–5.1)
PROT SERPL-MCNC: 7 G/DL (ref 6–8.4)
RBC # BLD AUTO: 5.16 M/UL (ref 4–5.4)
SODIUM SERPL-SCNC: 141 MMOL/L (ref 136–145)
TRIGL SERPL-MCNC: 199 MG/DL (ref 30–150)
TSH SERPL DL<=0.005 MIU/L-ACNC: 1.96 UIU/ML (ref 0.4–4)
WBC # BLD AUTO: 8.94 K/UL (ref 3.9–12.7)

## 2020-11-03 PROCEDURE — 80061 LIPID PANEL: CPT

## 2020-11-03 PROCEDURE — 85025 COMPLETE CBC W/AUTO DIFF WBC: CPT

## 2020-11-03 PROCEDURE — 84443 ASSAY THYROID STIM HORMONE: CPT

## 2020-11-03 PROCEDURE — 80053 COMPREHEN METABOLIC PANEL: CPT

## 2020-11-03 PROCEDURE — 82306 VITAMIN D 25 HYDROXY: CPT

## 2020-11-03 PROCEDURE — 36415 COLL VENOUS BLD VENIPUNCTURE: CPT

## 2020-11-05 ENCOUNTER — TELEPHONE (OUTPATIENT)
Dept: FAMILY MEDICINE | Facility: CLINIC | Age: 85
End: 2020-11-05

## 2020-11-05 NOTE — TELEPHONE ENCOUNTER
----- Message from Bertha Nguyen MD sent at 11/5/2020  8:24 AM CST -----  Some of your test results are abnormal. Elevated lipid and glucose noted. Please arrange appointment to further discuss results and plan of care.

## 2020-11-30 RX ORDER — TRIMETHOPRIM 100 MG/1
100 TABLET ORAL DAILY
Qty: 30 TABLET | Refills: 11 | Status: SHIPPED | OUTPATIENT
Start: 2020-11-30 | End: 2021-12-06

## 2020-12-13 ENCOUNTER — HEALTH MAINTENANCE LETTER (OUTPATIENT)
Age: 85
End: 2020-12-13

## 2021-01-07 ENCOUNTER — PATIENT MESSAGE (OUTPATIENT)
Dept: UROLOGY | Facility: CLINIC | Age: 86
End: 2021-01-07

## 2021-01-07 DIAGNOSIS — N39.0 RECURRENT UTI: ICD-10-CM

## 2021-01-07 RX ORDER — METHENAMINE HIPPURATE 1000 MG/1
1 TABLET ORAL 2 TIMES DAILY
Qty: 60 TABLET | Refills: 11 | Status: SHIPPED | OUTPATIENT
Start: 2021-01-07 | End: 2022-02-22

## 2021-04-16 ENCOUNTER — PATIENT MESSAGE (OUTPATIENT)
Dept: RESEARCH | Facility: HOSPITAL | Age: 86
End: 2021-04-16

## 2021-04-17 ENCOUNTER — HEALTH MAINTENANCE LETTER (OUTPATIENT)
Age: 86
End: 2021-04-17

## 2021-09-26 ENCOUNTER — HEALTH MAINTENANCE LETTER (OUTPATIENT)
Age: 86
End: 2021-09-26

## 2021-12-08 ENCOUNTER — TELEPHONE (OUTPATIENT)
Dept: UROLOGY | Facility: CLINIC | Age: 86
End: 2021-12-08
Payer: COMMERCIAL

## 2022-02-03 ENCOUNTER — PATIENT OUTREACH (OUTPATIENT)
Dept: ADMINISTRATIVE | Facility: HOSPITAL | Age: 87
End: 2022-02-03
Payer: COMMERCIAL

## 2022-03-15 ENCOUNTER — PES CALL (OUTPATIENT)
Dept: ADMINISTRATIVE | Facility: CLINIC | Age: 87
End: 2022-03-15
Payer: COMMERCIAL

## 2022-04-13 ENCOUNTER — PATIENT OUTREACH (OUTPATIENT)
Dept: ADMINISTRATIVE | Facility: HOSPITAL | Age: 87
End: 2022-04-13
Payer: COMMERCIAL

## 2022-05-08 ENCOUNTER — HEALTH MAINTENANCE LETTER (OUTPATIENT)
Age: 87
End: 2022-05-08

## 2022-06-07 ENCOUNTER — PATIENT MESSAGE (OUTPATIENT)
Dept: PRIMARY CARE CLINIC | Facility: CLINIC | Age: 87
End: 2022-06-07
Payer: COMMERCIAL

## 2022-07-01 ENCOUNTER — PES CALL (OUTPATIENT)
Dept: ADMINISTRATIVE | Facility: CLINIC | Age: 87
End: 2022-07-01
Payer: COMMERCIAL

## 2022-07-07 ENCOUNTER — PATIENT MESSAGE (OUTPATIENT)
Dept: UROLOGY | Facility: CLINIC | Age: 87
End: 2022-07-07
Payer: COMMERCIAL

## 2022-07-19 ENCOUNTER — PATIENT MESSAGE (OUTPATIENT)
Dept: RESEARCH | Facility: CLINIC | Age: 87
End: 2022-07-19
Payer: COMMERCIAL

## 2022-08-01 ENCOUNTER — PES CALL (OUTPATIENT)
Dept: ADMINISTRATIVE | Facility: CLINIC | Age: 87
End: 2022-08-01
Payer: COMMERCIAL

## 2022-10-03 DIAGNOSIS — Z71.89 COMPLEX CARE COORDINATION: ICD-10-CM

## 2022-12-06 ENCOUNTER — PES CALL (OUTPATIENT)
Dept: ADMINISTRATIVE | Facility: OTHER | Age: 87
End: 2022-12-06
Payer: COMMERCIAL

## 2023-01-08 ENCOUNTER — HEALTH MAINTENANCE LETTER (OUTPATIENT)
Age: 88
End: 2023-01-08

## 2023-05-25 DIAGNOSIS — N39.0 RECURRENT UTI: ICD-10-CM

## 2023-05-26 ENCOUNTER — TELEPHONE (OUTPATIENT)
Dept: UROLOGY | Facility: CLINIC | Age: 88
End: 2023-05-26
Payer: COMMERCIAL

## 2023-05-26 NOTE — TELEPHONE ENCOUNTER
Spoke with patient daughter (Sofia) and informed the patient needs and appointment to continue the medication refill, patient daughter ask why, and I informed her the patient hasn't been seen since 2019 and offer her an appointment for 6/9 at 1:40 am. The patient daughter is also aware that Dr. Farrar is out off the office until June. Patient voice her understanding and accepted the appointment.

## 2023-05-26 NOTE — TELEPHONE ENCOUNTER
----- Message from Chely Bedolla sent at 5/26/2023  2:47 PM CDT -----  .Type:  RX Refill Request    Who Called: pt  Refill or New Rx:refill  RX Name and Strength:methenamine (HIPREX) 1 gram Tab  How is the patient currently taking it? (ex. 1XDay):Take 1 tablet by mouth twice daily  Is this a 30 day or 90 day RX:60 tablets  Preferred Pharmacy with phone number:[facility] [address] [place], [place] - [address] [facility]  Local or Mail Order:local  Ordering Provider:Charan  Would the patient rather a call back or a response via [facility]? Call back  Best Call Back Number:[phone]  Additional Information:

## 2023-06-01 ENCOUNTER — TELEPHONE (OUTPATIENT)
Dept: UROLOGY | Facility: CLINIC | Age: 88
End: 2023-06-01
Payer: COMMERCIAL

## 2023-06-01 RX ORDER — METHENAMINE HIPPURATE 1000 MG/1
1 TABLET ORAL 2 TIMES DAILY
Qty: 60 TABLET | Refills: 11 | Status: SHIPPED | OUTPATIENT
Start: 2023-06-01

## 2023-06-01 NOTE — TELEPHONE ENCOUNTER
Spoke with patient daughter again and inform her that Dr. Farrar is still out of the office until Tuesday, and if the patient needs to be seen to take her to an urgent care or the ED.I gave her the soonest appointment available which is 6/9 and patient daughter accepted the appointment.

## 2023-06-01 NOTE — TELEPHONE ENCOUNTER
----- Message from Montez Navarro sent at 6/1/2023 11:07 AM CDT -----  Contact: Pt  .Type:  Sooner Apoointment Request    Caller is requesting a sooner appointment.  Caller declined first available appointment listed below.  Caller will not accept being placed on the waitlist and is requesting a message be sent to doctor.  Name of Caller:pt  When is the first available appointment? 06/09/2023  Symptoms: uti  Would the patient rather a call back or a response via [facility]?  Call back  Best Call Back Number:[phone]  Additional Information:

## 2023-06-02 ENCOUNTER — HEALTH MAINTENANCE LETTER (OUTPATIENT)
Age: 88
End: 2023-06-02

## 2023-06-03 DIAGNOSIS — Z71.89 COMPLEX CARE COORDINATION: ICD-10-CM

## 2023-06-09 ENCOUNTER — OFFICE VISIT (OUTPATIENT)
Dept: UROLOGY | Facility: CLINIC | Age: 88
End: 2023-06-09
Payer: MEDICARE

## 2023-06-09 VITALS
DIASTOLIC BLOOD PRESSURE: 82 MMHG | HEART RATE: 57 BPM | BODY MASS INDEX: 25.65 KG/M2 | SYSTOLIC BLOOD PRESSURE: 166 MMHG | WEIGHT: 144.75 LBS | HEIGHT: 63 IN

## 2023-06-09 DIAGNOSIS — N39.0 RECURRENT UTI: Primary | ICD-10-CM

## 2023-06-09 PROCEDURE — 99999 PR PBB SHADOW E&M-EST. PATIENT-LVL III: ICD-10-PCS | Mod: PBBFAC,,, | Performed by: STUDENT IN AN ORGANIZED HEALTH CARE EDUCATION/TRAINING PROGRAM

## 2023-06-09 PROCEDURE — 99204 OFFICE O/P NEW MOD 45 MIN: CPT | Mod: S$PBB,,, | Performed by: STUDENT IN AN ORGANIZED HEALTH CARE EDUCATION/TRAINING PROGRAM

## 2023-06-09 PROCEDURE — 99204 PR OFFICE/OUTPT VISIT, NEW, LEVL IV, 45-59 MIN: ICD-10-PCS | Mod: S$PBB,,, | Performed by: STUDENT IN AN ORGANIZED HEALTH CARE EDUCATION/TRAINING PROGRAM

## 2023-06-09 PROCEDURE — 99999 PR PBB SHADOW E&M-EST. PATIENT-LVL III: CPT | Mod: PBBFAC,,, | Performed by: STUDENT IN AN ORGANIZED HEALTH CARE EDUCATION/TRAINING PROGRAM

## 2023-06-09 PROCEDURE — 99213 OFFICE O/P EST LOW 20 MIN: CPT | Mod: PBBFAC,PO | Performed by: STUDENT IN AN ORGANIZED HEALTH CARE EDUCATION/TRAINING PROGRAM

## 2023-06-09 RX ORDER — LORAZEPAM 0.5 MG/1
0.5 TABLET ORAL
COMMUNITY
Start: 2023-03-15
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
## 2023-12-04 NOTE — TELEPHONE ENCOUNTER
Patient with generalized weakness  UTI symptoms  - Will send in fosfomycin 3 g - repeat q3 days for 2 more doses if no improvement.  - If fosfomycin is too expensive, TMP-SMX DS PO BID x3 was also ordered   
Spontaneous, unlabored and symmetrical

## 2024-01-03 DIAGNOSIS — Z71.89 COMPLEX CARE COORDINATION: ICD-10-CM

## 2024-05-06 ENCOUNTER — OFFICE VISIT (OUTPATIENT)
Dept: UROLOGY | Facility: CLINIC | Age: 89
End: 2024-05-06
Payer: MEDICARE

## 2024-05-06 VITALS
SYSTOLIC BLOOD PRESSURE: 179 MMHG | BODY MASS INDEX: 25.62 KG/M2 | HEIGHT: 63 IN | WEIGHT: 144.63 LBS | HEART RATE: 114 BPM | DIASTOLIC BLOOD PRESSURE: 93 MMHG

## 2024-05-06 DIAGNOSIS — N39.0 RECURRENT UTI: ICD-10-CM

## 2024-05-06 DIAGNOSIS — R10.2 SUPRAPUBIC PAIN: Primary | ICD-10-CM

## 2024-05-06 DIAGNOSIS — R35.1 NOCTURIA: ICD-10-CM

## 2024-05-06 PROCEDURE — 87088 URINE BACTERIA CULTURE: CPT

## 2024-05-06 PROCEDURE — 87186 SC STD MICRODIL/AGAR DIL: CPT

## 2024-05-06 PROCEDURE — 87077 CULTURE AEROBIC IDENTIFY: CPT

## 2024-05-06 PROCEDURE — 87086 URINE CULTURE/COLONY COUNT: CPT

## 2024-05-06 PROCEDURE — 99999 PR PBB SHADOW E&M-EST. PATIENT-LVL IV: CPT | Mod: PBBFAC,,,

## 2024-05-06 PROCEDURE — 99213 OFFICE O/P EST LOW 20 MIN: CPT | Mod: S$PBB,,,

## 2024-05-06 PROCEDURE — 99214 OFFICE O/P EST MOD 30 MIN: CPT | Mod: PBBFAC,PO

## 2024-05-06 RX ORDER — CIPROFLOXACIN 500 MG/1
500 TABLET ORAL 2 TIMES DAILY
Qty: 14 TABLET | Refills: 0 | Status: SHIPPED | OUTPATIENT
Start: 2024-05-06 | End: 2024-05-13

## 2024-05-06 RX ORDER — ASCORBIC ACID 500 MG
1000 TABLET ORAL 2 TIMES DAILY
Qty: 30 TABLET | Refills: 11 | Status: SHIPPED | OUTPATIENT
Start: 2024-05-06

## 2024-05-06 RX ORDER — METHENAMINE HIPPURATE 1000 MG/1
1 TABLET ORAL 2 TIMES DAILY
Qty: 60 TABLET | Refills: 11 | Status: SHIPPED | OUTPATIENT
Start: 2024-05-06

## 2024-05-06 NOTE — PATIENT INSTRUCTIONS
Start taking ciprofloxacin 500mg two times per day for 7 days. Stop taking methenamine and Trimpex while taking ciprofloxacin  Start methenamine 1g two times per day and Vitamin C 1000mg two times per day after finishing course of ciprofloxacin  Increase water intake to 8 glasses of water per day, increase fiber intake to help with BMs.

## 2024-05-06 NOTE — PROGRESS NOTES
Subjective:       Patient ID: Tristin Weeks is a 89 y.o. female.    Chief Complaint: re-current UTIs     This is a 89 y.o.  female patient that is new to me but not new to the system.  This is a patient of Dr. Farrar who has  long history of re-current UTIs, 2 UTIs per year. Symptoms usually consist of an increase in urination, generalized weakness, agitation, anxiety, and suprapubic pressure. Reports that she is trying to increase her hydration but struggles to drink more than 2 bottles of water per day. BMs consist of episodes of diarrhea, sometimes constipation. Denies incontinence.  Reports that she has been on prophylactic treatment for re-current UTIs since 2023: 1g methenamine BID and trimethoprim 100mg daily. Patient reports that this regimen has been helping but about two weeks ago she stopped taking the medications as prescribed. Reports that she started taking both medications every other day or sometimes went more than every other day without taking them. Reports that she started having UTI symptoms about 2 weeks ago. Reports that she went to her PCP and UA showed negative for RBC, nitrites, positive for leukocytes, and urine culture was pending. Reports that she was not started on an antibiotic. Today symptoms consist of  increase in urination at night, suprapubic pain/pressure, feeling of weakness and very anxious. Denies fevers, chills, dysuria, and hematuria.      Lab Results   Component Value Date    CREATININE 1.0 11/03/2020       ---  PMH/PSH/Medications/Allergies/Social history reviewed and as in chart.    Review of Systems   Constitutional:  Negative for chills and fever.   Respiratory:  Negative for shortness of breath.    Cardiovascular:  Negative for chest pain and palpitations.   Gastrointestinal:  Positive for diarrhea. Negative for abdominal pain and constipation.   Genitourinary:  Positive for frequency and pelvic pain. Negative for difficulty urinating, dysuria, flank pain, hematuria,  urgency and vaginal pain.   Neurological:  Negative for dizziness and weakness.   Psychiatric/Behavioral:  Negative for agitation, confusion and sleep disturbance.      Objective:      Physical Exam  HENT:      Head: Normocephalic.   Pulmonary:      Effort: Pulmonary effort is normal.   Abdominal:      General: Abdomen is flat.      Palpations: Abdomen is soft.   Genitourinary:     Urethra: Prolapse present.      Vagina: Erythema and tenderness present.   Musculoskeletal:         General: Normal range of motion.      Cervical back: Normal range of motion.   Skin:     General: Skin is warm and dry.   Neurological:      Mental Status: She is alert and oriented to person, place, and time.       Assessment:     Problem Noted   Suprapubic Pain 5/6/2024   Nocturia 5/6/2024   Recurrent Uti 6/15/2018    Ppx methenamine and Trimpex         Plan:     Collected catheterized urine using sterile technique, Urine sent for urine culture  Start taking ciprofloxacin 500mg BID for 7 days. Stop taking methenamine and Trimpex while taking ciprofloxacin  Start methenamine 1g BID and Vitamin C 1000mg BID after finishing course of ciprofloxacin  Increase water intake to 8 glasses of water per day, increase fiber intake to help with BMs.  Follow-up 1 month     NENO Reyna spent a total of 30 minutes on the day of the visit.This includes face to face time and non-face to face time preparing to see the patient (eg, review of tests), obtaining and/or reviewing separately obtained history, documenting clinical information in the electronic or other health record, independently interpreting results and communicating results to the patient/family/caregiver, or care coordinator.

## 2024-05-08 LAB — BACTERIA UR CULT: ABNORMAL

## 2024-05-10 ENCOUNTER — PATIENT MESSAGE (OUTPATIENT)
Dept: UROLOGY | Facility: CLINIC | Age: 89
End: 2024-05-10
Payer: COMMERCIAL

## 2024-06-05 NOTE — PROGRESS NOTES
Subjective:       Patient ID: Tristin Weeks is a 89 y.o. female.    Chief Complaint: re-current UTIs     This is a 89 y.o.  female patient that is new to me but not new to the system.  This is a patient of Dr. Farrar who has  long history of re-current UTIs, 2 UTIs per year. Symptoms usually consist of an increase in urination, generalized weakness, agitation, anxiety, and suprapubic pressure. Reports that she is trying to increase her hydration but struggles to drink more than 2 bottles of water per day. BMs consist of episodes of diarrhea, sometimes constipation. Denies incontinence.  Reports that she has been on prophylactic treatment for re-current UTIs since 2023: 1g methenamine BID and trimethoprim 100mg daily. Patient reports that this regimen has been helping but about two weeks ago she stopped taking the medications as prescribed. Reports that she started taking both medications every other day or sometimes went more than every other day without taking them. Reports that she started having UTI symptoms about 2 weeks ago. Reports that she went to her PCP and UA showed negative for RBC, nitrites, positive for leukocytes, and urine culture was pending. Reports that she was not started on an antibiotic. Today symptoms consist of  increase in urination at night, suprapubic pain/pressure, feeling of weakness and very anxious. Denies fevers, chills, dysuria, and hematuria.  6/6/24:   Urine culture positive for klebsiella Pneumoniae >100,000, patient treated with ciprofloxacin BID x7 days. Today patient reports that she took all of the ciprofloxacin and was feeling better for a couple of days. She started taking the methenamine with vitamin C as well as the trimethoprim as prescribed by Dr. Farrar. Reports intermittent right flank pain that starts to the right back and radiates to the right side of her abdomen. Reports nocturia 2-3 times at night. Daughter reports that she thinks the UTI has not resolved because she is  still agitated and does not want to get of bed some days. Denies dysuria, frequency, urgency, suprapubic pressure, hematuria, straining, weak stream.      Lab Results   Component Value Date    CREATININE 1.0 11/03/2020       ---  PMH/PSH/Medications/Allergies/Social history reviewed and as in chart.    Review of Systems   Constitutional:  Negative for chills and fever.   Respiratory:  Negative for shortness of breath.    Cardiovascular:  Negative for chest pain and palpitations.   Gastrointestinal:  Negative for abdominal pain, constipation and diarrhea.   Genitourinary:  Positive for flank pain. Negative for difficulty urinating, dysuria, frequency, hematuria, pelvic pain, urgency and vaginal pain.   Neurological:  Negative for dizziness and weakness.   Psychiatric/Behavioral:  Negative for agitation, confusion and sleep disturbance.        Objective:      Physical Exam  HENT:      Head: Normocephalic.   Pulmonary:      Effort: Pulmonary effort is normal.   Abdominal:      General: Abdomen is flat.      Palpations: Abdomen is soft.   Genitourinary:     Urethra: Prolapse present.      Vagina: Erythema and tenderness present.      Comments: Collected urine culture with catheterization using sterile technique, patient tolerated well.  Musculoskeletal:         General: Normal range of motion.      Cervical back: Normal range of motion.   Skin:     General: Skin is warm and dry.   Neurological:      Mental Status: She is alert and oriented to person, place, and time.         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  Continue methenamine 1g BID and Vitamin C 1000mg BID as well as trimethoprim 100mg daily.  Increase water intake to 8  glasses of water per day, increase fiber intake to help with BMs.  WOODY and CMP ordered, will call with results.  Follow-up 2 months     NENO Reyna    I spent a total of 30 minutes on the day of the visit.This includes face to face time and non-face to face time preparing to see the patient (eg, review of tests), obtaining and/or reviewing separately obtained history, documenting clinical information in the electronic or other health record, independently interpreting results and communicating results to the patient/family/caregiver, or care coordinator.

## 2024-06-06 ENCOUNTER — OFFICE VISIT (OUTPATIENT)
Dept: UROLOGY | Facility: CLINIC | Age: 89
End: 2024-06-06
Payer: MEDICARE

## 2024-06-06 ENCOUNTER — LAB VISIT (OUTPATIENT)
Dept: LAB | Facility: HOSPITAL | Age: 89
End: 2024-06-06
Payer: MEDICARE

## 2024-06-06 VITALS
BODY MASS INDEX: 24.42 KG/M2 | SYSTOLIC BLOOD PRESSURE: 136 MMHG | HEART RATE: 109 BPM | HEIGHT: 64 IN | WEIGHT: 143.06 LBS | DIASTOLIC BLOOD PRESSURE: 79 MMHG

## 2024-06-06 DIAGNOSIS — R10.9 RIGHT FLANK PAIN: Primary | ICD-10-CM

## 2024-06-06 DIAGNOSIS — N81.9 FEMALE GENITAL PROLAPSE, UNSPECIFIED TYPE: ICD-10-CM

## 2024-06-06 DIAGNOSIS — R10.9 RIGHT FLANK PAIN: ICD-10-CM

## 2024-06-06 DIAGNOSIS — R35.1 NOCTURIA: ICD-10-CM

## 2024-06-06 DIAGNOSIS — N39.0 RECURRENT UTI: ICD-10-CM

## 2024-06-06 LAB
ALBUMIN SERPL BCP-MCNC: 3.8 G/DL (ref 3.5–5.2)
ALP SERPL-CCNC: 101 U/L (ref 55–135)
ALT SERPL W/O P-5'-P-CCNC: 18 U/L (ref 10–44)
ANION GAP SERPL CALC-SCNC: 11 MMOL/L (ref 8–16)
AST SERPL-CCNC: 23 U/L (ref 10–40)
BILIRUB SERPL-MCNC: 0.3 MG/DL (ref 0.1–1)
BUN SERPL-MCNC: 20 MG/DL (ref 10–30)
CALCIUM SERPL-MCNC: 9.9 MG/DL (ref 8.7–10.5)
CHLORIDE SERPL-SCNC: 106 MMOL/L (ref 95–110)
CO2 SERPL-SCNC: 22 MMOL/L (ref 23–29)
CREAT SERPL-MCNC: 1 MG/DL (ref 0.5–1.4)
EST. GFR  (NO RACE VARIABLE): 53 ML/MIN/1.73 M^2
GLUCOSE SERPL-MCNC: 132 MG/DL (ref 70–110)
POTASSIUM SERPL-SCNC: 4.6 MMOL/L (ref 3.5–5.1)
PROT SERPL-MCNC: 7.4 G/DL (ref 6–8.4)
SODIUM SERPL-SCNC: 139 MMOL/L (ref 136–145)

## 2024-06-06 PROCEDURE — 80053 COMPREHEN METABOLIC PANEL: CPT

## 2024-06-06 PROCEDURE — 99999 PR PBB SHADOW E&M-EST. PATIENT-LVL IV: CPT | Mod: PBBFAC,,,

## 2024-06-06 PROCEDURE — 99214 OFFICE O/P EST MOD 30 MIN: CPT | Mod: PBBFAC,PO

## 2024-06-06 PROCEDURE — 36415 COLL VENOUS BLD VENIPUNCTURE: CPT

## 2024-06-06 PROCEDURE — 87086 URINE CULTURE/COLONY COUNT: CPT

## 2024-06-06 PROCEDURE — 99213 OFFICE O/P EST LOW 20 MIN: CPT | Mod: S$PBB,,,

## 2024-06-06 NOTE — PATIENT INSTRUCTIONS
UTI precautions:  Wipe front to back and avoid constipation.  Avoid caffeine.  Drink 2 liter of water daily  Void every 3-4 hrs  Void soon after urge arises  No dryer sheets or harsh detergents with the undergarments  No bubble baths  Avoid tight fitting clothes and panty hose  Continue Probiotic

## 2024-06-07 LAB — BACTERIA UR CULT: NO GROWTH

## 2024-06-10 ENCOUNTER — PATIENT MESSAGE (OUTPATIENT)
Dept: UROLOGY | Facility: CLINIC | Age: 89
End: 2024-06-10
Payer: COMMERCIAL

## 2024-06-11 ENCOUNTER — PATIENT MESSAGE (OUTPATIENT)
Dept: UROLOGY | Facility: CLINIC | Age: 89
End: 2024-06-11
Payer: COMMERCIAL

## 2024-06-11 ENCOUNTER — HOSPITAL ENCOUNTER (OUTPATIENT)
Dept: RADIOLOGY | Facility: HOSPITAL | Age: 89
Discharge: HOME | End: 2024-06-11
Payer: MEDICARE

## 2024-06-11 DIAGNOSIS — R10.9 RIGHT FLANK PAIN: ICD-10-CM

## 2024-06-11 PROCEDURE — 76770 US EXAM ABDO BACK WALL COMP: CPT | Mod: TC

## 2024-06-11 PROCEDURE — 76770 US EXAM ABDO BACK WALL COMP: CPT | Mod: 26,,, | Performed by: RADIOLOGY

## 2024-06-13 RX ORDER — TRIMETHOPRIM 100 MG/1
100 TABLET ORAL DAILY
Qty: 30 TABLET | Refills: 5 | Status: SHIPPED | OUTPATIENT
Start: 2024-06-13

## 2024-07-03 DIAGNOSIS — M85.80 OSTEOPENIA: Primary | ICD-10-CM

## 2024-07-17 ENCOUNTER — HOSPITAL ENCOUNTER (OUTPATIENT)
Dept: RADIOLOGY | Facility: HOSPITAL | Age: 89
Discharge: HOME | End: 2024-07-17
Attending: INTERNAL MEDICINE
Payer: MEDICARE

## 2024-07-17 DIAGNOSIS — M85.80 OSTEOPENIA: ICD-10-CM

## 2024-07-17 PROCEDURE — 77080 DXA BONE DENSITY AXIAL: CPT | Mod: TC

## 2024-07-17 PROCEDURE — 77080 DXA BONE DENSITY AXIAL: CPT | Mod: 26,,, | Performed by: RADIOLOGY

## 2024-08-03 DIAGNOSIS — Z71.89 COMPLEX CARE COORDINATION: ICD-10-CM

## 2024-08-05 PROBLEM — N39.0 RECURRENT UTI: Status: RESOLVED | Noted: 2018-06-15 | Resolved: 2024-08-05

## 2024-08-06 ENCOUNTER — OFFICE VISIT (OUTPATIENT)
Dept: UROLOGY | Facility: CLINIC | Age: 89
End: 2024-08-06
Payer: MEDICARE

## 2024-08-06 VITALS
WEIGHT: 145.31 LBS | DIASTOLIC BLOOD PRESSURE: 85 MMHG | HEIGHT: 64 IN | BODY MASS INDEX: 24.81 KG/M2 | SYSTOLIC BLOOD PRESSURE: 140 MMHG | HEART RATE: 74 BPM

## 2024-08-06 DIAGNOSIS — N39.0 RECURRENT UTI: Primary | ICD-10-CM

## 2024-08-06 PROBLEM — R10.9 RIGHT FLANK PAIN: Status: RESOLVED | Noted: 2024-06-06 | Resolved: 2024-08-06

## 2024-08-06 PROBLEM — R10.2 SUPRAPUBIC PAIN: Status: RESOLVED | Noted: 2024-05-06 | Resolved: 2024-08-06

## 2024-08-06 PROCEDURE — 99213 OFFICE O/P EST LOW 20 MIN: CPT | Mod: S$PBB,,,

## 2024-08-06 PROCEDURE — 99999 PR PBB SHADOW E&M-EST. PATIENT-LVL III: CPT | Mod: PBBFAC,,,

## 2024-08-06 PROCEDURE — 99213 OFFICE O/P EST LOW 20 MIN: CPT | Mod: PBBFAC,PO

## 2024-08-06 RX ORDER — LOSARTAN POTASSIUM 50 MG/1
50 TABLET ORAL
COMMUNITY

## 2024-12-23 RX ORDER — TRIMETHOPRIM 100 MG/1
100 TABLET ORAL DAILY
Qty: 30 TABLET | Refills: 5 | Status: CANCELLED | OUTPATIENT
Start: 2024-12-23

## 2024-12-23 RX ORDER — TRIMETHOPRIM 100 MG/1
100 TABLET ORAL DAILY
Qty: 30 TABLET | Refills: 5 | Status: SHIPPED | OUTPATIENT
Start: 2024-12-23

## 2024-12-23 NOTE — TELEPHONE ENCOUNTER
----- Message from Antoinette sent at 12/23/2024 12:09 PM CST -----  Type:  RX Refill Request    Who Called: carlos (daughter)  Refill or New Rx:refill  RX Name and Strength:trimethoprim (TRIMPEX) 100 mg Tab  Preferred Pharmacy with phone number:[facility] [place] [facility] [address]  [address] [place] [address]  Phone: [phone] Fax: [phone]  Local or Mail Order:local  Ordering Provider:Rebeca  Would the patient rather a call back or a response via [facility]? call  Best Call Back Number:[phone]  Additional Information: pt is also requesting a call. Pt has 2 days of medication left pt is at pharm trying to get refill

## 2025-06-19 ENCOUNTER — TELEPHONE (OUTPATIENT)
Dept: UROLOGY | Facility: CLINIC | Age: OVER 89
End: 2025-06-19
Payer: MEDICARE

## 2025-06-19 ENCOUNTER — PATIENT MESSAGE (OUTPATIENT)
Dept: UROLOGY | Facility: CLINIC | Age: OVER 89
End: 2025-06-19
Payer: MEDICARE

## 2025-06-19 NOTE — TELEPHONE ENCOUNTER
Copied from CRM #[id number]. Topic: Medications - Medication Refill  >> Jun 19, 2025  9:34 AM Isaiah wrote:  Type: Requestingrefill         Who Called: PT  Regarding: trimethoprim (TRIMPEX) 100 mg Tab  And methenamine (HIPREX) 1 gram Tab   Would the patient rather a call back or a response via [facility]? Call back  Best Call Back Number: [phone] (M)    Additional Information:   [place] [address] - [place] [facility] [place] [address] [place] [address]  Phone: [phone] Fax: [phone]  Hours: Not open 24 hours

## 2025-06-19 NOTE — TELEPHONE ENCOUNTER
Spoke with patient daughter and informed her that Daja is out of the office and the patient would need an appointment before the medication can be refill. Patient daughter question who is taking care of the Daja's patient while she's out. I informed her that the patient are being taking care of according to there needs and she can call patient PCP to refill medication until her appointment time. She voice her understanding and will give office a call back.

## 2025-06-20 ENCOUNTER — TELEPHONE (OUTPATIENT)
Dept: UROLOGY | Facility: CLINIC | Age: OVER 89
End: 2025-06-20
Payer: MEDICARE

## 2025-06-20 NOTE — TELEPHONE ENCOUNTER
Copied from CRM #[id number]. Topic: Medications - Pharmacy  >> Jun 20, 2025  9:22 AM Alina wrote:  Type:  Pharmacy Calling to Clarify an RX      Pharmacy Name:[facility]  Prescription Name:trimethoprim (TRIMPEX) 100 mg Tab,   methenamine (HIPREX) 1 gram Tab     What do they need to clarify?:Refill   Best Call Back Number: fax   Additional Information:

## 2025-06-20 NOTE — TELEPHONE ENCOUNTER
Spoke with [facility] pharmacy and informed them that the patient needs to be seen before the medication can be refill and I informed the patient daughter of an appointment and about the refill.